# Patient Record
Sex: FEMALE | Race: ASIAN | Employment: FULL TIME | URBAN - METROPOLITAN AREA
[De-identification: names, ages, dates, MRNs, and addresses within clinical notes are randomized per-mention and may not be internally consistent; named-entity substitution may affect disease eponyms.]

---

## 2017-03-03 ENCOUNTER — OFFICE VISIT (OUTPATIENT)
Dept: FAMILY MEDICINE CLINIC | Age: 42
End: 2017-03-03

## 2017-03-03 ENCOUNTER — HOSPITAL ENCOUNTER (OUTPATIENT)
Dept: GENERAL RADIOLOGY | Age: 42
Discharge: HOME | End: 2017-03-03
Payer: COMMERCIAL

## 2017-03-03 VITALS
RESPIRATION RATE: 16 BRPM | SYSTOLIC BLOOD PRESSURE: 121 MMHG | HEART RATE: 80 BPM | OXYGEN SATURATION: 100 % | HEIGHT: 62 IN | TEMPERATURE: 98 F | DIASTOLIC BLOOD PRESSURE: 76 MMHG | BODY MASS INDEX: 31.17 KG/M2 | WEIGHT: 169.4 LBS

## 2017-03-03 DIAGNOSIS — J30.89 ALLERGIC RHINITIS DUE TO OTHER ALLERGEN: Primary | ICD-10-CM

## 2017-03-03 DIAGNOSIS — K21.9 GASTROESOPHAGEAL REFLUX DISEASE WITHOUT ESOPHAGITIS: ICD-10-CM

## 2017-03-03 DIAGNOSIS — M54.50 CHRONIC BILATERAL LOW BACK PAIN WITHOUT SCIATICA: ICD-10-CM

## 2017-03-03 DIAGNOSIS — G89.29 CHRONIC BILATERAL LOW BACK PAIN WITHOUT SCIATICA: ICD-10-CM

## 2017-03-03 LAB
HCG URINE, QL. (POC): NEGATIVE
VALID INTERNAL CONTROL?: YES

## 2017-03-03 PROCEDURE — 72100 X-RAY EXAM L-S SPINE 2/3 VWS: CPT

## 2017-03-03 RX ORDER — OMEPRAZOLE 20 MG/1
20 CAPSULE, DELAYED RELEASE ORAL DAILY
Qty: 30 CAP | Refills: 2 | Status: SHIPPED | OUTPATIENT
Start: 2017-03-03 | End: 2017-05-19 | Stop reason: SDUPTHER

## 2017-03-03 RX ORDER — GLUCOSAMINE SULFATE 1500 MG
POWDER IN PACKET (EA) ORAL DAILY
COMMUNITY
End: 2020-04-18

## 2017-03-03 RX ORDER — FLUTICASONE PROPIONATE 50 MCG
2 SPRAY, SUSPENSION (ML) NASAL DAILY
Qty: 1 BOTTLE | Refills: 1 | Status: SHIPPED | OUTPATIENT
Start: 2017-03-03 | End: 2019-02-27

## 2017-03-03 RX ORDER — CETIRIZINE HCL 10 MG
10 TABLET ORAL DAILY
Qty: 30 TAB | Refills: 1 | Status: SHIPPED | OUTPATIENT
Start: 2017-03-03 | End: 2017-05-19 | Stop reason: SDUPTHER

## 2017-03-03 NOTE — PATIENT INSTRUCTIONS
Back Pain: Care Instructions  Your Care Instructions    Back pain has many possible causes. It is often related to problems with muscles and ligaments of the back. It may also be related to problems with the nerves, discs, or bones of the back. Moving, lifting, standing, sitting, or sleeping in an awkward way can strain the back. Sometimes you don't notice the injury until later. Arthritis is another common cause of back pain. Although it may hurt a lot, back pain usually improves on its own within several weeks. Most people recover in 12 weeks or less. Using good home treatment and being careful not to stress your back can help you feel better sooner. Follow-up care is a key part of your treatment and safety. Be sure to make and go to all appointments, and call your doctor if you are having problems. Its also a good idea to know your test results and keep a list of the medicines you take. How can you care for yourself at home? · Sit or lie in positions that are most comfortable and reduce your pain. Try one of these positions when you lie down:  ¨ Lie on your back with your knees bent and supported by large pillows. ¨ Lie on the floor with your legs on the seat of a sofa or chair. [place] Loach on your side with your knees and hips bent and a pillow between your legs. ¨ Lie on your stomach if it does not make pain worse. · Do not sit up in bed, and avoid soft couches and twisted positions. Bed rest can help relieve pain at first, but it delays healing. Avoid bed rest after the first day of back pain. · Change positions every 30 minutes. If you must sit for long periods of time, take breaks from sitting. Get up and walk around, or lie in a comfortable position. · Try using a heating pad on a low or medium setting for 15 to 20 minutes every 2 or 3 hours. Try a warm shower in place of one session with the heating pad. · You can also try an ice pack for 10 to 15 minutes every 2 to 3 hours.  Put a thin cloth between the ice pack and your skin. · Take pain medicines exactly as directed. ¨ If the doctor gave you a prescription medicine for pain, take it as prescribed. ¨ If you are not taking a prescription pain medicine, ask your doctor if you can take an over-the-counter medicine. · Take short walks several times a day. You can start with 5 to 10 minutes, 3 or 4 times a day, and work up to longer walks. Walk on level surfaces and avoid hills and stairs until your back is better. · Return to work and other activities as soon as you can. Continued rest without activity is usually not good for your back. · To prevent future back pain, do exercises to stretch and strengthen your back and stomach. Learn how to use good posture, safe lifting techniques, and proper body mechanics. When should you call for help? Call your doctor now or seek immediate medical care if:  · You have new or worsening numbness in your legs. · You have new or worsening weakness in your legs. (This could make it hard to stand up.)  · You lose control of your bladder or bowels. Watch closely for changes in your health, and be sure to contact your doctor if:  · Your pain gets worse. · You are not getting better after 2 weeks. Where can you learn more? Go to http://judson-giovanni.info/. Enter G090 in the search box to learn more about \"Back Pain: Care Instructions. \"  Current as of: May 23, 2016  Content Version: 11.1  © 7337-9479 [employer]. Care instructions adapted under license by [employer] (which disclaims liability or warranty for this information). If you have questions about a medical condition or this instruction, always ask your healthcare professional. [address] any warranty or liability for your use of this information. Learning About Relief for Back Pain  What is back tension and strain?     Back strain happens when you overstretch, or pull, a muscle in your back. You may hurt your back in an accident or when you exercise or lift something. Most back pain will get better with rest and time. You can take care of yourself at home to help your back heal.  What can you do first to relieve back pain? When you first feel back pain, try these steps:  · Walk. Take a short walk (10 to 20 minutes) on a level surface (no slopes, hills, or stairs) every 2 to 3 hours. Walk only distances you can manage without pain, especially leg pain. · Relax. Find a comfortable position for rest. Some people are comfortable on the floor or a medium-firm bed with a small pillow under their head and another under their knees. Some people prefer to lie on their side with a pillow between their knees. Don't stay in one position for too long. · Try heat or ice. Try using a heating pad on a low or medium setting, or take a warm shower, for 15 to 20 minutes every 2 to 3 hours. Or you can buy single-use heat wraps that last up to 8 hours. You can also try an ice pack for 10 to 15 minutes every 2 to 3 hours. You can use an ice pack or a bag of frozen vegetables wrapped in a thin towel. There is not strong evidence that either heat or ice will help, but you can try them to see if they help. You may also want to try switching between heat and cold. · Take pain medicine exactly as directed. ¨ If the doctor gave you a prescription medicine for pain, take it as prescribed. ¨ If you are not taking a prescription pain medicine, ask your doctor if you can take an over-the-counter medicine. What else can you do? · Stretch and exercise. Exercises that increase flexibility may relieve your pain and make it easier for your muscles to keep your spine in a good, neutral position. And don't forget to keep walking. · Do self-massage. You can use self-massage to unwind after work or school or to energize yourself in the morning. You can easily massage your feet, hands, or neck.  Self-massage works best if you are in comfortable clothes and are sitting or lying in a comfortable position. Use oil or lotion to massage bare skin. · Reduce stress. Back pain can lead to a vicious [place]: Distress about the pain tenses the muscles in your back, which in turn causes more pain. Learn how to relax your mind and your muscles to lower your stress. Where can you learn more? Go to http://judson-giovanni.info/. Enter H009 in the search box to learn more about \"Learning About Relief for Back Pain. \"  Current as of: May 23, 2016  Content Version: 11.1  © 8913-7282 [employer]. Care instructions adapted under license by [employer] (which disclaims liability or warranty for this information). If you have questions about a medical condition or this instruction, always ask your healthcare professional. [address] any warranty or liability for your use of this information. Back Stretches: Exercises  Your Care Instructions  Here are some examples of exercises for stretching your back. Start each exercise slowly. Ease off the exercise if you start to have pain. Your doctor or physical therapist will tell you when you can start these exercises and which ones will work best for you. How to do the exercises  Overhead stretch    1. Stand comfortably with your feet shoulder-width apart. 2. Looking straight ahead, raise both arms over your head and reach toward the ceiling. Do not allow your head to tilt back. 3. Hold for 15 to 30 seconds, then lower your arms to your sides. 4. Repeat 2 to 4 times. Side stretch    1. Stand comfortably with your feet shoulder-width apart. 2. Raise one arm over your head, and then lean to the other side. 3. Slide your hand down your leg as you let the weight of your arm gently stretch your side muscles. Hold for 15 to 30 seconds. 4. Repeat 2 to 4 times on each side. Press-up    1.  Lie on your stomach, supporting your body with your forearms. 2. Press your elbows down into the floor to raise your upper back. As you do this, relax your stomach muscles and allow your back to arch without using your back muscles. As your press up, do not let your hips or pelvis come off the floor. 3. Hold for 15 to 30 seconds, then relax. 4. Repeat 2 to 4 times. Relax and rest    1. Lie on your back with a rolled towel under your neck and a pillow under your knees. Extend your arms comfortably to your sides. 2. Relax and breathe normally. 3. Remain in this position for about 10 minutes. 4. If you can, do this 2 or 3 times each day. Follow-up care is a key part of your treatment and safety. Be sure to make and go to all appointments, and call your doctor if you are having problems. It's also a good idea to know your test results and keep a list of the medicines you take. Where can you learn more? Go to http://judson-giovanni.info/. Enter K547 in the search box to learn more about \"Back Stretches: Exercises. \"  Current as of: May 23, 2016  Content Version: 11.1  © 9143-9104 [employer]. Care instructions adapted under license by [employer] (which disclaims liability or warranty for this information). If you have questions about a medical condition or this instruction, always ask your healthcare professional. [address] any warranty or liability for your use of this information.

## 2017-03-03 NOTE — PROGRESS NOTES
Chief Complaint   Patient presents with   [facility] Establish Care     cough, drainage down back of throat, no color to drainage    Mass     bump noted  on left side of rib cage

## 2017-03-03 NOTE — PROGRESS NOTES
Patient Identification  Lidia Villasenor is a 46 y.o. female. Who's new to our practice. Previous PCP Mary Thrasher, last there 10/2016. Chief Complaint   Establish Care (cough, drainage down back of throat, no color to drainage) and Mass (bump noted  on left side of rib cage)    Allergies: was previously tried on zyrtec and flonase by another doctor but admit to not being compliance. Discussed needs for an honest try. Denies fever, chills, illness, myalgia. Pt also have a history of eczema. Patient presents for evaluation of dry cough. Onset of symptoms was several years ago, with unchanged since that time. Respiratory symptoms include runny nose and congestion. Patient denies chest pain and shortness of breath. Patient has similar previous allergic reactions. Patient denies exposure to new medications or allergens. GERD: no vomiting, melena. Haven't tried anything. Low back pain chronic for years, worsen the last month. 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 No new symptoms. No radiation, currently pain level mild. Denies saddle anesthesia, loss of bowel or bladder, LE weakness, fever, chills. Past Medical History:   Diagnosis Date    IBS (irritable bowel syndrome)      Family History   Problem Relation Age of Onset    Asthma Mother     Hypertension Father     Diabetes Father     Elevated Lipids Father     Cancer Sister      Current Outpatient Prescriptions   Medication Sig Dispense Refill    cholecalciferol (VITAMIN D3) 1,000 unit cap Take  by mouth daily.  fluticasone (FLONASE) 50 mcg/actuation nasal spray 2 Sprays by Both Nostrils route daily. 1 Bottle 1    cetirizine (ZYRTEC) 10 mg tablet Take 1 Tab by mouth daily. 30 Tab 1    omeprazole (PRILOSEC) 20 mg capsule Take 1 Cap by mouth daily.  30 Cap 2     Allergies   Allergen Reactions    Banana Other (comments)     GERD    Macrobid [Nitrofurantoin Monohyd/M-Cryst] Hives Social History     Social History    Marital status: SINGLE     Spouse name: N/A    Number of children: N/A    Years of education: N/A     Occupational History    Not on file. Social History Main Topics    Smoking status: Never Smoker    Smokeless tobacco: Never Used    Alcohol use No    Drug use: No    Sexual activity: Yes     Partners: Male     Birth control/ protection: IUD     Other Topics Concern    Not on file     Social History Narrative     Review of Systems  Pertinent items are noted in HPI. Physical Exam     Visit Vitals    /76 (BP 1 Location: Right arm, BP Patient Position: Sitting)    Pulse 80    Temp 98 °F (36.7 °C) (Oral)    Resp 16    Ht 5' 2\" (1.575 m)    Wt 169 lb 6.4 oz (76.8 kg)    LMP 03/03/2017    SpO2 100%    BMI 30.98 kg/m2     General appearance: alert, cooperative, no distress, appears stated age  Head: Normocephalic, without obvious abnormality, atraumatic  Eyes: conjunctivae/corneas clear. PERRL, EOM's intact  Ears: normal TM's and external ear canals AU  Nose: clear discharge, mild congestion, turbinates pale  Throat: Lips, mucosa, and tongue normal. Teeth and gums normal  Neck: supple, symmetrical, trachea midline, no adenopathy, no carotid bruit and no JVD  Lungs: clear to auscultation bilaterally  Heart: regular rate and rhythm, S1, S2 normal, no murmur, click, rub or gallop  Abdomen: soft, non-tender. Bowel sounds normal. No masses,  no organomegaly  Extremities: extremities normal, atraumatic, no cyanosis or edema  Pulses: 2+ and symmetric  Neurologic: Alert and oriented X 3, normal strength and tone. Normal symmetric reflexes. Normal coordination and gait  Back: knees reflex +2 bilat. Negative straight leg raise. Normal sensate, strength.        Results for orders placed or performed in visit on 03/03/17   AMB POC URINE PREGNANCY TEST, VISUAL COLOR COMPARISON   Result Value Ref Range    VALID INTERNAL CONTROL POC Yes     HCG urine, Ql. (POC) Negative Negative       ASSESSMENT/PLANS    [facility]. Diagnoses and all orders for this visit:    Allergic rhinitis due to other allergen  -     AMB POC URINE PREGNANCY TEST, VISUAL COLOR COMPARISON  -     fluticasone (FLONASE) 50 mcg/actuation nasal spray; 2 Sprays by Both Nostrils route daily. -     cetirizine (ZYRTEC) 10 mg tablet; Take 1 Tab by mouth daily. Gastroesophageal reflux disease without esophagitis  -     AMB POC URINE PREGNANCY TEST, VISUAL COLOR COMPARISON  -     omeprazole (PRILOSEC) 20 mg capsule; Take 1 Cap by mouth daily.     Chronic bilateral low back pain without sciatica  -     AMB POC URINE PREGNANCY TEST, VISUAL COLOR COMPARISON  -     XR SPINE LUMB 2 OR 3 V; Future        Follow-up Disposition:  Return in about 2 weeks (around 3/17/2017) for annual exam.      Marce Raman MD  3/3/2017

## 2017-03-03 NOTE — MR AVS SNAPSHOT
Visit Information Date & Time Provider Department Dept. Phone Encounter #  
 3/3/2017  8:45 AM Bernardo Higginbotham MD [facility] at [address] [id number] Follow-up Instructions Return in about 2 weeks (around 3/17/2017) for annual exam. Upcoming Health Maintenance Date Due  
 PAP AKA CERVICAL CYTOLOGY 3/15/1998 DTaP/Tdap/Td series (1 - Tdap) 10/26/2011 INFLUENZA AGE 9 TO ADULT 8/1/2016 COLONOSCOPY 5/16/2021 Allergies as of 3/3/2017  Review Complete On: 3/3/2017 By: Bernardo Higginbotham MD  
  
 Severity Noted Reaction Type Reactions Banana  03/31/2011    Other (comments) GERD Macrobid [Nitrofurantoin Monohyd/m-cryst]  03/31/2011    Hives Current Immunizations  Reviewed on 10/25/2011 Name Date Hepatitis B Vaccine 10/25/2011 10:40 AM  
 Influenza Vaccine Split 10/25/2011 10:42 AM  
 PPD 8/8/2011 TD Vaccine 10/25/2011 10:39 AM  
  
 Not reviewed this visit You Were Diagnosed With   
  
 Codes Comments Allergic rhinitis due to other allergen    -  Primary ICD-10-CM: J30.89 ICD-9-CM: 477.8 Gastroesophageal reflux disease without esophagitis     ICD-10-CM: K21.9 ICD-9-CM: 530.81 Chronic bilateral low back pain without sciatica     ICD-10-CM: M54.5, G89.29 ICD-9-CM: 724.2, 338.29 Vitals BP  
  
  
  
  
  
 121/76 (BP 1 Location: Right arm, BP Patient Position: Sitting) BMI and BSA Data Body Mass Index Body Surface Area 30.98 kg/m 2 1.83 m 2 Preferred Pharmacy Pharmacy Name Phone [address] [facility] [address], [address] [facility] [phone] Your Updated Medication List  
  
   
This list is accurate as of: 3/3/17  9:29 AM.  Always use your most recent med list.  
  
  
  
  
 cetirizine 10 mg tablet Commonly known as:  ZYRTEC Take 1 Tab by mouth daily. fluticasone 50 mcg/actuation nasal spray Commonly known as:  Jai Rogers 2 Sprays by Both Nostrils route daily. omeprazole 20 mg capsule Commonly known as:  PRILOSEC Take 1 Cap by mouth daily. VITAMIN D3 1,000 unit Cap Generic drug:  cholecalciferol Take  by mouth daily. Prescriptions Sent to Pharmacy Refills  
 fluticasone (FLONASE) 50 mcg/actuation nasal spray 1 Si Sprays by Both Nostrils route daily. Class: Normal  
 Pharmacy: [facility], [address] [place] [address] RD AT [address] [place] [address] Ph #: [phone] Route: Both Nostrils  
 cetirizine (ZYRTEC) 10 mg tablet 1 Sig: Take 1 Tab by mouth daily. Class: Normal  
 Pharmacy: [facility] [address] [facility] [address], [address] [place] [address] RD AT [address] [place] [address] Ph #: [phone] Route: Oral  
 omeprazole (PRILOSEC) 20 mg capsule 2 Sig: Take 1 Cap by mouth daily. Class: Normal  
 Pharmacy: [facility], [address] [place] [address] RD AT [address] [place] [address] Ph #: [phone] Route: Oral  
  
We Performed the Following AMB POC URINE PREGNANCY TEST, VISUAL COLOR COMPARISON [33433 CPT(R)] Follow-up Instructions Return in about 2 weeks (around 3/17/2017) for annual exam. To-Do List   
 2017 Imaging:  XR SPINE LUMB 2 OR 3 V Patient Instructions Back Pain: Care Instructions Your Care Instructions Back pain has many possible causes. It is often related to problems with muscles and ligaments of the back. It may also be related to problems with the nerves, discs, or bones of the back. Moving, lifting, standing, sitting, or sleeping in an awkward way can strain the back. Sometimes you don't notice the injury until later. Arthritis is another common cause of back pain. Although it may hurt a lot, back pain usually improves on its own within several weeks. Most people recover in 12 weeks or less.  Using good home treatment and being careful not to stress your back can help you feel better sooner. Follow-up care is a key part of your treatment and safety. Be sure to make and go to all appointments, and call your doctor if you are having problems. Its also a good idea to know your test results and keep a list of the medicines you take. How can you care for yourself at home? · Sit or lie in positions that are most comfortable and reduce your pain. Try one of these positions when you lie down: ¨ Lie on your back with your knees bent and supported by large pillows. ¨ Lie on the floor with your legs on the seat of a sofa or chair. Lucy Perez on your side with your knees and hips bent and a pillow between your legs. ¨ Lie on your stomach if it does not make pain worse. · Do not sit up in bed, and avoid soft couches and twisted positions. Bed rest can help relieve pain at first, but it delays healing. Avoid bed rest after the first day of back pain. · Change positions every 30 minutes. If you must sit for long periods of time, take breaks from sitting. Get up and walk around, or lie in a comfortable position. · Try using a heating pad on a low or medium setting for 15 to 20 minutes every 2 or 3 hours. Try a warm shower in place of one session with the heating pad. · You can also try an ice pack for 10 to 15 minutes every 2 to 3 hours. Put a thin cloth between the ice pack and your skin. · Take pain medicines exactly as directed. ¨ If the doctor gave you a prescription medicine for pain, take it as prescribed. ¨ If you are not taking a prescription pain medicine, ask your doctor if you can take an over-the-counter medicine. · Take short walks several times a day. You can start with 5 to 10 minutes, 3 or 4 times a day, and work up to longer walks. Walk on level surfaces and avoid hills and stairs until your back is better. · Return to work and other activities as soon as you can.  Continued rest without activity is usually not good for your back. · To prevent future back pain, do exercises to stretch and strengthen your back and stomach. Learn how to use good posture, safe lifting techniques, and proper body mechanics. When should you call for help? Call your doctor now or seek immediate medical care if: 
· You have new or worsening numbness in your legs. · You have new or worsening weakness in your legs. (This could make it hard to stand up.) · You lose control of your bladder or bowels. Watch closely for changes in your health, and be sure to contact your doctor if: 
· Your pain gets worse. · You are not getting better after 2 weeks. Where can you learn more? Go to http://judson-giovanni.info/. Enter S560 in the search box to learn more about \"Back Pain: Care Instructions. \" Current as of: May 23, 2016 Content Version: 11.1 © 2465-8765 [employer]. Care instructions adapted under license by [employer] (which disclaims liability or warranty for this information). If you have questions about a medical condition or this instruction, always ask your healthcare professional. [place] [address] any warranty or liability for your use of this information. Learning About Relief for Back Pain What is back tension and strain? Back strain happens when you overstretch, or pull, a muscle in your back. You may hurt your back in an accident or when you exercise or lift something. Most back pain will get better with rest and time. You can take care of yourself at home to help your back heal. 
What can you do first to relieve back pain? When you first feel back pain, try these steps: 
· Walk. Take a short walk (10 to 20 minutes) on a level surface (no slopes, hills, or stairs) every 2 to 3 hours. Walk only distances you can manage without pain, especially leg pain. · Relax.  Find a comfortable position for rest. Some people are comfortable on the floor or a medium-firm bed with a small pillow under their head and another under their knees. Some people prefer to lie on their side with a pillow between their knees. Don't stay in one position for too long. · Try heat or ice. Try using a heating pad on a low or medium setting, or take a warm shower, for 15 to 20 minutes every 2 to 3 hours. Or you can buy single-use heat wraps that last up to 8 hours. You can also try an ice pack for 10 to 15 minutes every 2 to 3 hours. You can use an ice pack or a bag of frozen vegetables wrapped in a thin towel. There is not strong evidence that either heat or ice will help, but you can try them to see if they help. You may also want to try switching between heat and cold. · Take pain medicine exactly as directed. ¨ If the doctor gave you a prescription medicine for pain, take it as prescribed. ¨ If you are not taking a prescription pain medicine, ask your doctor if you can take an over-the-counter medicine. What else can you do? · Stretch and exercise. Exercises that increase flexibility may relieve your pain and make it easier for your muscles to keep your spine in a good, neutral position. And don't forget to keep walking. · Do self-massage. You can use self-massage to unwind after work or school or to energize yourself in the morning. You can easily massage your feet, hands, or neck. Self-massage works best if you are in comfortable clothes and are sitting or lying in a comfortable position. Use oil or lotion to massage bare skin. · Reduce stress. Back pain can lead to a vicious Bill [place]: Distress about the pain tenses the muscles in your back, which in turn causes more pain. Learn how to relax your mind and your muscles to lower your stress. Where can you learn more? Go to http://judson-giovanni.info/. Enter F440 in the search box to learn more about \"Learning About Relief for Back Pain. \" Current as of: May 23, 2016 Content Version: 11.1 © 6672-3583 Healthwise, Incorporated. Care instructions adapted under license by [employer] (which disclaims liability or warranty for this information). If you have questions about a medical condition or this instruction, always ask your healthcare professional. [address] any warranty or liability for your use of this information. Back Stretches: Exercises Your Care Instructions Here are some examples of exercises for stretching your back. Start each exercise slowly. Ease off the exercise if you start to have pain. Your doctor or physical therapist will tell you when you can start these exercises and which ones will work best for you. How to do the exercises Overhead stretch 1. Stand comfortably with your feet shoulder-width apart. 2. Looking straight ahead, raise both arms over your head and reach toward the ceiling. Do not allow your head to tilt back. 3. Hold for 15 to 30 seconds, then lower your arms to your sides. 4. Repeat 2 to 4 times. Side stretch 1. Stand comfortably with your feet shoulder-width apart. 2. Raise one arm over your head, and then lean to the other side. 3. Slide your hand down your leg as you let the weight of your arm gently stretch your side muscles. Hold for 15 to 30 seconds. 4. Repeat 2 to 4 times on each side. Press-up 1. Lie on your stomach, supporting your body with your forearms. 2. Press your elbows down into the floor to raise your upper back. As you do this, relax your stomach muscles and allow your back to arch without using your back muscles. As your press up, do not let your hips or pelvis come off the floor. 3. Hold for 15 to 30 seconds, then relax. 4. Repeat 2 to 4 times. Relax and rest 
 
1. Lie on your back with a rolled towel under your neck and a pillow under your knees. Extend your arms comfortably to your sides. 2. Relax and breathe normally. 3. Remain in this position for about 10 minutes. 4. If you can, do this 2 or 3 times each day. Follow-up care is a key part of your treatment and safety. Be sure to make and go to all appointments, and call your doctor if you are having problems. It's also a good idea to know your test results and keep a list of the medicines you take. Where can you learn more? Go to http://judson-giovanni.info/. Enter B833 in the search box to learn more about \"Back Stretches: Exercises. \" Current as of: May 23, 2016 Content Version: 11.1 © 3465-3396 SwipeStation. Care instructions adapted under license by [employer] (which disclaims liability or warranty for this information). If you have questions about a medical condition or this instruction, always ask your healthcare professional. [address] any warranty or liability for your use of this information. Introducing [facility] & HEALTH SERVICES! Addis Suarez introduces [employer] patient portal. Now you can access parts of your medical record, email your doctor's office, and request medication refills online. 1. In your internet browser, go to [email or web address] 2. Click on the First Time User? Click Here link in the Sign In box. You will see the New Member Sign Up page. 3. Enter your [employer] Access Code exactly as it appears below. You will not need to use this code after youve completed the sign-up process. If you do not sign up before the expiration date, you must request a new code. · [employer] Access Code: [email or web address] Expires: 6/1/2017  9:29 AM 
 
4. Enter the last four digits of your Social Security Number (xxxx) and Date of Birth (mm/dd/yyyy) as indicated and click Submit. You will be taken to the next sign-up page. 5. Create a [email or web address] ID. This will be your [employer] login ID and cannot be changed, so think of one that is secure and easy to remember. 6. Create a [email or web address] password. You can change your password at any time. 7. Enter your Password Reset Question and Answer. This can be used at a later time if you forget your password. 8. Enter your e-mail address. You will receive e-mail notification when new information is available in [address] 9. Click Sign Up. You can now view and download portions of your medical record. 10. Click the Download Summary menu link to download a portable copy of your medical information. If you have questions, please visit the Frequently Asked Questions section of the [email or web address] website. Remember, [email or web address] is NOT to be used for urgent needs. For medical emergencies, dial 911. Now available from your iPhone and Android! Please provide this summary of care documentation to your next provider. Your primary care clinician is listed as NONE. If you have any questions after today's visit, please call [phone].

## 2017-03-17 ENCOUNTER — OFFICE VISIT (OUTPATIENT)
Dept: FAMILY MEDICINE CLINIC | Age: 42
End: 2017-03-17

## 2017-03-17 VITALS
HEIGHT: 62 IN | SYSTOLIC BLOOD PRESSURE: 119 MMHG | TEMPERATURE: 98.2 F | BODY MASS INDEX: 31.1 KG/M2 | HEART RATE: 90 BPM | RESPIRATION RATE: 16 BRPM | OXYGEN SATURATION: 100 % | DIASTOLIC BLOOD PRESSURE: 73 MMHG | WEIGHT: 169 LBS

## 2017-03-17 DIAGNOSIS — R73.03 PREDIABETES: ICD-10-CM

## 2017-03-17 DIAGNOSIS — Z97.5 IUD (INTRAUTERINE DEVICE) IN PLACE: ICD-10-CM

## 2017-03-17 DIAGNOSIS — Z00.00 ANNUAL PHYSICAL EXAM: Primary | ICD-10-CM

## 2017-03-17 DIAGNOSIS — J30.9 ALLERGIC RHINOCONJUNCTIVITIS: ICD-10-CM

## 2017-03-17 DIAGNOSIS — H10.10 ALLERGIC RHINOCONJUNCTIVITIS: ICD-10-CM

## 2017-03-17 DIAGNOSIS — Z23 ENCOUNTER FOR IMMUNIZATION: ICD-10-CM

## 2017-03-17 DIAGNOSIS — E61.1 IRON DEFICIENCY: ICD-10-CM

## 2017-03-17 PROBLEM — Z79.899 ENCOUNTER FOR LONG-TERM (CURRENT) USE OF MEDICATIONS: Status: ACTIVE | Noted: 2017-03-17

## 2017-03-17 PROBLEM — Z83.3 FAMILY HISTORY OF DIABETES MELLITUS: Status: ACTIVE | Noted: 2017-03-17

## 2017-03-17 RX ORDER — MONTELUKAST SODIUM 10 MG/1
10 TABLET ORAL DAILY
Qty: 30 TAB | Refills: 1 | Status: SHIPPED | OUTPATIENT
Start: 2017-03-17 | End: 2019-02-06

## 2017-03-17 NOTE — PROGRESS NOTES
Chief Complaint   Patient presents with    Annual Exam   Papsmear done last month at [facility], results enlarged uterus.

## 2017-03-17 NOTE — MR AVS SNAPSHOT
Visit Information Date & Time Provider Department Dept. Phone Encounter #  
 3/17/2017  8:45 AM Iron Valdez MD [facility] at [address] [id number] Follow-up Instructions Return in about 2 months (around 5/17/2017) for 3rd hep B shot, allergies. Upcoming Health Maintenance Date Due  
 PAP AKA CERVICAL CYTOLOGY 1/26/2020 COLONOSCOPY 5/16/2021 DTaP/Tdap/Td series (2 - Td) 3/17/2027 Allergies as of 3/17/2017  Review Complete On: 3/3/2017 By: Iron Valdez MD  
  
 Severity Noted Reaction Type Reactions Banana  03/31/2011    Other (comments) GERD Macrobid [Nitrofurantoin Monohyd/m-cryst]  03/31/2011    Hives Current Immunizations  Reviewed on 10/25/2011 Name Date Hep A and Hep B Vaccine  Incomplete Hepatitis B Vaccine 10/25/2011 10:40 AM  
 Influenza Vaccine Split 10/25/2011 10:42 AM  
 PPD 8/8/2011 TD Vaccine 10/25/2011 10:39 AM  
  
 Not reviewed this visit You Were Diagnosed With   
  
 Codes Comments Annual physical exam    -  Primary ICD-10-CM: Z00.00 ICD-9-CM: V70.0 IUD (intrauterine device) in place     ICD-10-CM: Z97.5 ICD-9-CM: V45.51 Prediabetes     ICD-10-CM: R73.03 
ICD-9-CM: 790.29 Encounter for immunization     ICD-10-CM: J06 ICD-9-CM: V03.89 BMI 30.0-30.9,adult     ICD-10-CM: Z68.30 ICD-9-CM: V85.30 Allergic rhinoconjunctivitis     ICD-10-CM: J30.9, H10.10 ICD-9-CM: 477.9, 372.05 Vitals BP Pulse Temp Resp Height(growth percentile) Weight(growth percentile) 119/73 (BP 1 Location: Right arm, BP Patient Position: Sitting) 90 98.2 °F (36.8 °C) (Oral) 16 5' 2\" (1.575 m) 169 lb (76.7 kg) LMP SpO2 BMI OB Status Smoking Status 03/03/2017 100% 30.91 kg/m2 Having regular periods Never Smoker BMI and BSA Data Body Mass Index Body Surface Area 30.91 kg/m 2 1.83 m 2 Preferred Pharmacy Pharmacy Name Phone [address] [facility] [phone] Your Updated Medication List  
  
   
This list is accurate as of: 3/17/17  9:31 AM.  Always use your most recent med list.  
  
  
  
  
 cetirizine 10 mg tablet Commonly known as:  ZYRTEC Take 1 Tab by mouth daily. fluticasone 50 mcg/actuation nasal spray Commonly known as:  Sarika Earnest 2 Sprays by Both Nostrils route daily. montelukast 10 mg tablet Commonly known as:  SINGULAIR Take 1 Tab by mouth daily. omeprazole 20 mg capsule Commonly known as:  PRILOSEC Take 1 Cap by mouth daily. VITAMIN D3 1,000 unit Cap Generic drug:  cholecalciferol Take  by mouth daily. Prescriptions Sent to Pharmacy Refills  
 montelukast (SINGULAIR) 10 mg tablet 1 Sig: Take 1 Tab by mouth daily. Class: Normal  
 Pharmacy: [facility], [address] [place] [address] RD AT [address] [facility] Ph #: [phone] Route: Oral  
  
We Performed the Following CBC W/O DIFF [82636 CPT(R)] HEMOGLOBIN A1C W/O EAG [95904 CPT(R)] HEPATITIS A AND HEPATITIS B (HEPA-HEPB), ADULT DOSAGE, IM [76200 CPT(R)] LIPID PANEL [49280 CPT(R)] METABOLIC PANEL, COMPREHENSIVE [80612 CPT(R)] TSH RFX ON ABNORMAL TO FREE T4 [[id number] Custom] URINALYSIS W/ RFLX MICROSCOPIC [89759 CPT(R)] Follow-up Instructions Return in about 2 months (around 5/17/2017) for 3rd hep B shot, allergies. Introducing hospitals & HEALTH SERVICES! Erica Obrien introduces [employer] patient portal. Now you can access parts of your medical record, email your doctor's office, and request medication refills online. 1. In your internet browser, go to [email or web address] 2. Click on the First Time User? Click Here link in the Sign In box. You will see the New Member Sign Up page. 3. Enter your [employer] Access Code exactly as it appears below. You will not need to use this code after youve completed the sign-up process. If you do not sign up before the expiration date, you must request a new code. · [employer] Access Code: [email or web address] Expires: 6/1/2017 10:29 AM 
 
4. Enter the last four digits of your Social Security Number (xxxx) and Date of Birth (mm/dd/yyyy) as indicated and click Submit. You will be taken to the next sign-up page. 5. Create a [employer] ID. This will be your [employer] login ID and cannot be changed, so think of one that is secure and easy to remember. 6. Create a [employer] password. You can change your password at any time. 7. Enter your Password Reset Question and Answer. This can be used at a later time if you forget your password. 8. Enter your e-mail address. You will receive e-mail notification when new information is available in [address] 9. Click Sign Up. You can now view and download portions of your medical record. 10. Click the Download Summary menu link to download a portable copy of your medical information. If you have questions, please visit the Frequently Asked Questions section of the [employer] website. Remember, [employer] is NOT to be used for urgent needs. For medical emergencies, dial 911. Now available from your iPhone and Android! Please provide this summary of care documentation to your next provider. Your primary care clinician is listed as [facility]. If you have any questions after today's visit, please call [phone].

## 2017-03-18 LAB
ALBUMIN SERPL-MCNC: 4.5 G/DL (ref 3.5–5.5)
ALBUMIN/GLOB SERPL: 1.7 {RATIO} (ref 1.2–2.2)
ALP SERPL-CCNC: 46 IU/L (ref 39–117)
ALT SERPL-CCNC: 20 IU/L (ref 0–32)
APPEARANCE UR: CLEAR
AST SERPL-CCNC: 17 IU/L (ref 0–40)
BILIRUB SERPL-MCNC: 0.3 MG/DL (ref 0–1.2)
BILIRUB UR QL STRIP: NEGATIVE
BUN SERPL-MCNC: 9 MG/DL (ref 6–24)
BUN/CREAT SERPL: 13 (ref 9–23)
CALCIUM SERPL-MCNC: 9.4 MG/DL (ref 8.7–10.2)
CHLORIDE SERPL-SCNC: 100 MMOL/L (ref 96–106)
CHOLEST SERPL-MCNC: 199 MG/DL (ref 100–199)
CO2 SERPL-SCNC: 23 MMOL/L (ref 18–29)
COLOR UR: YELLOW
CREAT SERPL-MCNC: 0.69 MG/DL (ref 0.57–1)
ERYTHROCYTE [DISTWIDTH] IN BLOOD BY AUTOMATED COUNT: 15 % (ref 12.3–15.4)
GLOBULIN SER CALC-MCNC: 2.7 G/DL (ref 1.5–4.5)
GLUCOSE SERPL-MCNC: 89 MG/DL (ref 65–99)
GLUCOSE UR QL: NEGATIVE
HBA1C MFR BLD: 6.1 % (ref 4.8–5.6)
HCT VFR BLD AUTO: 37.7 % (ref 34–46.6)
HDLC SERPL-MCNC: 65 MG/DL
HGB BLD-MCNC: 12.5 G/DL (ref 11.1–15.9)
HGB UR QL STRIP: NEGATIVE
KETONES UR QL STRIP: NEGATIVE
LDLC SERPL CALC-MCNC: 103 MG/DL (ref 0–99)
LEUKOCYTE ESTERASE UR QL STRIP: NEGATIVE
MCH RBC QN AUTO: 27.2 PG (ref 26.6–33)
MCHC RBC AUTO-ENTMCNC: 33.2 G/DL (ref 31.5–35.7)
MCV RBC AUTO: 82 FL (ref 79–97)
MICRO URNS: NORMAL
NITRITE UR QL STRIP: NEGATIVE
PH UR STRIP: 7 [PH] (ref 5–7.5)
PLATELET # BLD AUTO: 313 X10E3/UL (ref 150–379)
POTASSIUM SERPL-SCNC: 4 MMOL/L (ref 3.5–5.2)
PROT SERPL-MCNC: 7.2 G/DL (ref 6–8.5)
PROT UR QL STRIP: NEGATIVE
RBC # BLD AUTO: 4.59 X10E6/UL (ref 3.77–5.28)
SODIUM SERPL-SCNC: 138 MMOL/L (ref 134–144)
SP GR UR: 1.01 (ref 1–1.03)
TRIGL SERPL-MCNC: 157 MG/DL (ref 0–149)
TSH SERPL DL<=0.005 MIU/L-ACNC: 1.4 UIU/ML (ref 0.45–4.5)
UROBILINOGEN UR STRIP-MCNC: 0.2 MG/DL (ref 0.2–1)
VLDLC SERPL CALC-MCNC: 31 MG/DL (ref 5–40)
WBC # BLD AUTO: 6.4 X10E3/UL (ref 3.4–10.8)

## 2017-04-19 ENCOUNTER — OFFICE VISIT (OUTPATIENT)
Dept: FAMILY MEDICINE CLINIC | Age: 42
End: 2017-04-19

## 2017-04-19 VITALS
OXYGEN SATURATION: 99 % | RESPIRATION RATE: 14 BRPM | WEIGHT: 169.8 LBS | SYSTOLIC BLOOD PRESSURE: 114 MMHG | DIASTOLIC BLOOD PRESSURE: 64 MMHG | BODY MASS INDEX: 31.25 KG/M2 | HEART RATE: 76 BPM | HEIGHT: 62 IN | TEMPERATURE: 97.8 F

## 2017-04-19 DIAGNOSIS — E55.9 VITAMIN D DEFICIENCY: ICD-10-CM

## 2017-04-19 DIAGNOSIS — R73.03 PRE-DIABETES: Primary | ICD-10-CM

## 2017-04-19 RX ORDER — ERGOCALCIFEROL 1.25 MG/1
50000 CAPSULE ORAL
Qty: 12 CAP | Refills: 0 | Status: SHIPPED | OUTPATIENT
Start: 2017-04-19 | End: 2019-02-27

## 2017-04-19 NOTE — MR AVS SNAPSHOT
Visit Information Date & Time Provider Department Dept. Phone Encounter #  
 4/19/2017  9:15 AM Christian Martin MD [facility] at [address] [id number] Follow-up Instructions Return in about 6 months (around 10/19/2017) for chronic care. Your Appointments 5/19/2017  8:45 AM  
ROUTINE CARE with Christian Martin MD  
[facility] at [facility]) Appt Note: fu on allergies   also 3rd hep b vaccine [facility] [address] [id number]  
[facility] Upcoming Health Maintenance Date Due  
 PAP AKA CERVICAL CYTOLOGY 1/26/2020 COLONOSCOPY 5/16/2021 DTaP/Tdap/Td series (2 - Td) 3/17/2027 Allergies as of 4/19/2017  Review Complete On: 4/19/2017 By: Christian Martin MD  
  
 Severity Noted Reaction Type Reactions Banana  03/31/2011    Other (comments) GERD Macrobid [Nitrofurantoin Monohyd/m-cryst]  03/31/2011    Hives Current Immunizations  Reviewed on 10/25/2011 Name Date Hep A and Hep B Vaccine 3/17/2017 Hepatitis B Vaccine 10/25/2011 10:40 AM  
 Influenza Vaccine Split 10/25/2011 10:42 AM  
 PPD 8/8/2011 TD Vaccine 10/25/2011 10:39 AM  
  
 Not reviewed this visit You Were Diagnosed With   
  
 Codes Comments Pre-diabetes    -  Primary ICD-10-CM: R73.03 
ICD-9-CM: 790.29 Vitamin D deficiency     ICD-10-CM: E55.9 ICD-9-CM: 268.9 Vitals BP Pulse Temp Resp Height(growth percentile) Weight(growth percentile) 114/64 (BP 1 Location: Left arm, BP Patient Position: Sitting) 76 97.8 °F (36.6 °C) (Oral) 14 5' 2\" (1.575 m) 169 lb 12.8 oz (77 kg) LMP SpO2 BMI OB Status Smoking Status 03/29/2017 99% 31.06 kg/m2 Having regular periods Never Smoker BMI and BSA Data Body Mass Index Body Surface Area 31.06 kg/m 2 1.84 m 2 Preferred Pharmacy Pharmacy Name Phone [facility], [address] [facility] [phone] Your Updated Medication List  
  
   
This list is accurate as of: 4/19/17  9:31 AM.  Always use your most recent med list.  
  
  
  
  
 cetirizine 10 mg tablet Commonly known as:  ZYRTEC Take 1 Tab by mouth daily. ergocalciferol 50,000 unit capsule Commonly known as:  ERGOCALCIFEROL Take 1 Cap by mouth every seven (7) days. fluticasone 50 mcg/actuation nasal spray Commonly known as:  [place] Petite 2 Sprays by Both Nostrils route daily. montelukast 10 mg tablet Commonly known as:  SINGULAIR Take 1 Tab by mouth daily. omeprazole 20 mg capsule Commonly known as:  PRILOSEC Take 1 Cap by mouth daily. VITAMIN D3 1,000 unit Cap Generic drug:  cholecalciferol Take  by mouth daily. Prescriptions Sent to Pharmacy Refills  
 ergocalciferol (ERGOCALCIFEROL) 50,000 unit capsule 0 Sig: Take 1 Cap by mouth every seven (7) days. Class: Normal  
 Pharmacy: [address] [facility] [address], [address] AT [address] [facility] #: [phone] Route: Oral  
  
Follow-up Instructions Return in about 6 months (around 10/19/2017) for chronic care. Introducing [facility] & HEALTH SERVICES! Meron Beltran introduces [employer] patient portal. Now you can access parts of your medical record, email your doctor's office, and request medication refills online. 1. In your internet browser, go to [email or web address] 2. Click on the First Time User? Click Here link in the Sign In box. You will see the New Member Sign Up page. 3. Enter your [employer] Access Code exactly as it appears below. You will not need to use this code after youve completed the sign-up process. If you do not sign up before the expiration date, you must request a new code. · [employer] Access Code: [email or web address] Expires: 6/1/2017 10:29 AM 
 
4. Enter the last four digits of your Social Security Number (xxxx) and Date of Birth (mm/dd/yyyy) as indicated and click Submit. You will be taken to the next sign-up page. 5. Create a [employer] ID. This will be your [employer] login ID and cannot be changed, so think of one that is secure and easy to remember. 6. Create a [employer] password. You can change your password at any time. 7. Enter your Password Reset Question and Answer. This can be used at a later time if you forget your password. 8. Enter your e-mail address. You will receive e-mail notification when new information is available in [address] 9. Click Sign Up. You can now view and download portions of your medical record. 10. Click the Download Summary menu link to download a portable copy of your medical information. If you have questions, please visit the Frequently Asked Questions section of the [employer] website. Remember, [employer] is NOT to be used for urgent needs. For medical emergencies, dial 911. Now available from your iPhone and Android! Please provide this summary of care documentation to your next provider. Your primary care clinician is listed as Melinda Mcduffie. If you have any questions after today's visit, please call [phone].

## 2017-04-19 NOTE — LETTER
4/19/2017 9:20 AM 
 
Ms. Ruby Garcia [phone] Dear Ruby Franklin: Please find your most recent results below. Discussed in office. Resulted Orders CBC W/O DIFF Result Value Ref Range WBC 6.4 3.4 - 10.8 x10E3/uL  
 RBC 4.59 3.77 - 5.28 x10E6/uL HGB 12.5 11.1 - 15.9 g/dL HCT 37.7 34.0 - 46.6 % MCV 82 79 - 97 fL  
 MCH 27.2 26.6 - 33.0 pg  
 MCHC 33.2 31.5 - 35.7 g/dL  
 RDW 15.0 12.3 - 15.4 % PLATELET 544 624 - 098 x10E3/uL Narrative Performed at:  [address]  [id number] : Pearl Zhang MD, Phone:  [phone] HEMOGLOBIN A1C W/O EAG Result Value Ref Range Hemoglobin A1c 6.1 (H) 4.8 - 5.6 % Comment:  
            Pre-diabetes: 5.7 - 6.4 Diabetes: >6.4 Glycemic control for adults with diabetes: <7.0 Narrative Performed at:  [address]  [id number] : Pearl Zhang MD, Phone:  [phone] LIPID PANEL Result Value Ref Range Cholesterol, total 199 100 - 199 mg/dL Triglyceride 157 (H) 0 - 149 mg/dL HDL Cholesterol 65 >39 mg/dL VLDL, calculated 31 5 - 40 mg/dL LDL, calculated 103 (H) 0 - 99 mg/dL Narrative Performed at:  [address]  [id number] : Pearl Zhang MD, Phone:  [phone] METABOLIC PANEL, COMPREHENSIVE Result Value Ref Range Glucose 89 65 - 99 mg/dL BUN 9 6 - 24 mg/dL Creatinine 0.69 0.57 - 1.00 mg/dL GFR est non- >59 mL/min/1.73 GFR est  >59 mL/min/1.73  
 BUN/Creatinine ratio 13 9 - 23 Sodium 138 134 - 144 mmol/L Potassium 4.0 3.5 - 5.2 mmol/L Chloride 100 96 - 106 mmol/L  
 CO2 23 18 - 29 mmol/L Calcium 9.4 8.7 - 10.2 mg/dL Protein, total 7.2 6.0 - 8.5 g/dL Albumin 4.5 3.5 - 5.5 g/dL GLOBULIN, TOTAL 2.7 1.5 - 4.5 g/dL A-G Ratio 1.7 1.2 - 2.2 Comment: **Please note reference interval change** Bilirubin, total 0.3 0.0 - 1.2 mg/dL Alk. phosphatase 46 39 - 117 IU/L  
 AST (SGOT) 17 0 - 40 IU/L  
 ALT (SGPT) 20 0 - 32 IU/L Narrative Performed at:  [address]  [id number] : Lupe Gregory MD, Phone:  [phone] TSH RFX ON ABNORMAL TO FREE T4 Result Value Ref Range TSH 1.400 0.450 - 4.500 uIU/mL Narrative Performed at:  [address]  [id number] : Lupe Gregory MD, Phone:  [phone] URINALYSIS W/ RFLX MICROSCOPIC Result Value Ref Range Specific Gravity 1.010 1.005 - 1.030  
 pH (UA) 7.0 5.0 - 7.5 Color Yellow Yellow Appearance Clear Clear Leukocyte Esterase Negative Negative Protein Negative Negative/Trace Glucose Negative Negative Ketone Negative Negative Blood Negative Negative Bilirubin Negative Negative Urobilinogen 0.2 0.2 - 1.0 mg/dL Nitrites Negative Negative Microscopic Examination Comment Comment:  
   Microscopic not indicated and not performed. Narrative Performed at:  [address]  [id number] : Lupe Gregory MD, Phone:  [phone] RECOMMENDATIONS: 
Work on diet and exercise. Please call me if you have any questions: [phone] Sincerely, Michael Jay MD

## 2017-04-19 NOTE — PROGRESS NOTES
Caroline Vasquez is a 38 y.o. female    3rd visit with this physician. Prediabetes, info given on diet and lifestyle. Vit D deficiency. Will write for [id number] Q7days. Reviewed: active problem list, medication list, allergies, notes from last encounter    A comprehensive review of systems was negative except for that written in the HPI. Labs reviewed with patient. Results for orders placed or performed in visit on 03/17/17   CBC W/O DIFF   Result Value Ref Range    WBC 6.4 3.4 - 10.8 x10E3/uL    RBC 4.59 3.77 - 5.28 x10E6/uL    HGB 12.5 11.1 - 15.9 g/dL    HCT 37.7 34.0 - 46.6 %    MCV 82 79 - 97 fL    MCH 27.2 26.6 - 33.0 pg    MCHC 33.2 31.5 - 35.7 g/dL    RDW 15.0 12.3 - 15.4 %    PLATELET 361 148 - 760 x10E3/uL   HEMOGLOBIN A1C W/O EAG   Result Value Ref Range    Hemoglobin A1c 6.1 (H) 4.8 - 5.6 %   LIPID PANEL   Result Value Ref Range    Cholesterol, total 199 100 - 199 mg/dL    Triglyceride 157 (H) 0 - 149 mg/dL    HDL Cholesterol 65 >39 mg/dL    VLDL, calculated 31 5 - 40 mg/dL    LDL, calculated 103 (H) 0 - 99 mg/dL   METABOLIC PANEL, COMPREHENSIVE   Result Value Ref Range    Glucose 89 65 - 99 mg/dL    BUN 9 6 - 24 mg/dL    Creatinine 0.69 0.57 - 1.00 mg/dL    GFR est non- >59 mL/min/1.73    GFR est  >59 mL/min/1.73    BUN/Creatinine ratio 13 9 - 23    Sodium 138 134 - 144 mmol/L    Potassium 4.0 3.5 - 5.2 mmol/L    Chloride 100 96 - 106 mmol/L    CO2 23 18 - 29 mmol/L    Calcium 9.4 8.7 - 10.2 mg/dL    Protein, total 7.2 6.0 - 8.5 g/dL    Albumin 4.5 3.5 - 5.5 g/dL    GLOBULIN, TOTAL 2.7 1.5 - 4.5 g/dL    A-G Ratio 1.7 1.2 - 2.2    Bilirubin, total 0.3 0.0 - 1.2 mg/dL    Alk.  phosphatase 46 39 - 117 IU/L    AST (SGOT) 17 0 - 40 IU/L    ALT (SGPT) 20 0 - 32 IU/L   TSH RFX ON ABNORMAL TO FREE T4   Result Value Ref Range    TSH 1.400 0.450 - 4.500 uIU/mL   URINALYSIS W/ RFLX MICROSCOPIC   Result Value Ref Range    Specific Gravity 1.010 1.005 - 1.030    pH (UA) 7.0 5.0 - 7.5 Color Yellow Yellow    Appearance Clear Clear    Leukocyte Esterase Negative Negative    Protein Negative Negative/Trace    Glucose Negative Negative    Ketone Negative Negative    Blood Negative Negative    Bilirubin Negative Negative    Urobilinogen 0.2 0.2 - 1.0 mg/dL    Nitrites Negative Negative    Microscopic Examination Comment        Allergies   Allergen Reactions    Banana Other (comments)     GERD    Macrobid [Nitrofurantoin Monohyd/M-Cryst] Hives     Current Outpatient Prescriptions on File Prior to Visit   Medication Sig Dispense Refill    cholecalciferol (VITAMIN D3) 1,000 unit cap Take  by mouth daily.  fluticasone (FLONASE) 50 mcg/actuation nasal spray 2 Sprays by Both Nostrils route daily. 1 Bottle 1    omeprazole (PRILOSEC) 20 mg capsule Take 1 Cap by mouth daily. 30 Cap 2    montelukast (SINGULAIR) 10 mg tablet Take 1 Tab by mouth daily. 30 Tab 1    cetirizine (ZYRTEC) 10 mg tablet Take 1 Tab by mouth daily. 30 Tab 1     No current facility-administered medications on file prior to visit. Patient Active Problem List   Diagnosis Code    Ganglion of tendon sheath M67.40    Annual physical exam Z00.00    Family history of diabetes mellitus Z83.3    Encounter for long-term (current) use of medications Z79.899    IUD (intrauterine device) in place Z97.5    BMI 30.0-30.9,adult Z68.30    Iron deficiency E61.1    Pre-diabetes R73.03    Vitamin D deficiency E55.9       Visit Vitals    /64 (BP 1 Location: Left arm, BP Patient Position: Sitting)    Pulse 76    Temp 97.8 °F (36.6 °C) (Oral)    Resp 14    Ht 5' 2\" (1.575 m)    Wt 169 lb 12.8 oz (77 kg)    LMP 03/29/2017    SpO2 99%    BMI 31.06 kg/m2     General appearance: alert, cooperative, no distress, appears stated age  Head: Normocephalic, without obvious abnormality, atraumatic  Eyes: conjunctivae/corneas clear. PERRL, EOM's intact.    Lungs: clear to auscultation bilaterally  Heart: regular rate and rhythm, S1, S2 normal, no murmur, click, rub or gallop  Abdomen: soft, non-tender. Extremities: extremities normal, atraumatic, no cyanosis or edema      Assessment/Plans:    Mary Erickson was seen today for results. Diagnoses and all orders for this visit:    Pre-diabetes    Vitamin D deficiency  -     ergocalciferol (ERGOCALCIFEROL) 50,000 unit capsule; Take 1 Cap by mouth every seven (7) days. Discussed plans, risk/benefits of treatments/observations. Through the use of shared decision making, above plans were agreed upon. Medication compliance advised. Patient verbalized understanding. Follow-up Disposition:  Return in about 6 months (around 10/19/2017) for prediabetes, chronic care.       Stacy Covarrubias MD  4/19/2017

## 2017-05-19 ENCOUNTER — OFFICE VISIT (OUTPATIENT)
Dept: FAMILY MEDICINE CLINIC | Age: 42
End: 2017-05-19

## 2017-05-19 VITALS
WEIGHT: 162.2 LBS | SYSTOLIC BLOOD PRESSURE: 112 MMHG | OXYGEN SATURATION: 100 % | DIASTOLIC BLOOD PRESSURE: 68 MMHG | HEART RATE: 68 BPM | RESPIRATION RATE: 16 BRPM | BODY MASS INDEX: 29.85 KG/M2 | HEIGHT: 62 IN | TEMPERATURE: 97.8 F

## 2017-05-19 DIAGNOSIS — Z23 ENCOUNTER FOR IMMUNIZATION: Primary | ICD-10-CM

## 2017-05-19 DIAGNOSIS — J30.89 ALLERGIC RHINITIS DUE TO OTHER ALLERGEN: ICD-10-CM

## 2017-05-19 DIAGNOSIS — K21.9 GASTROESOPHAGEAL REFLUX DISEASE WITHOUT ESOPHAGITIS: ICD-10-CM

## 2017-05-19 RX ORDER — OMEPRAZOLE 20 MG/1
20 CAPSULE, DELAYED RELEASE ORAL DAILY
Qty: 30 CAP | Refills: 2 | Status: SHIPPED | OUTPATIENT
Start: 2017-05-19 | End: 2019-02-27

## 2017-05-19 RX ORDER — CETIRIZINE HCL 10 MG
10 TABLET ORAL DAILY
Qty: 30 TAB | Refills: 1 | Status: SHIPPED | OUTPATIENT
Start: 2017-05-19 | End: 2019-02-06

## 2017-05-19 NOTE — PROGRESS NOTES
Chief Complaint   Patient presents with    Allergies     F/U    Immunization/Injection     3RD Hep c vacine

## 2017-05-19 NOTE — MR AVS SNAPSHOT
Visit Information Date & Time Provider Department Dept. Phone Encounter #  
 5/19/2017  8:45 AM Melinda Mcduffie MD [facility] at [address] [id number] Follow-up Instructions Return for as needed. Upcoming Health Maintenance Date Due INFLUENZA AGE 9 TO ADULT 8/1/2017 PAP AKA CERVICAL CYTOLOGY 1/26/2020 COLONOSCOPY 5/16/2021 DTaP/Tdap/Td series (2 - Td) 3/17/2027 Allergies as of 5/19/2017  Review Complete On: 5/19/2017 By: Melinda Mcduffie MD  
  
 Severity Noted Reaction Type Reactions Banana  03/31/2011    Other (comments) GERD Macrobid [Nitrofurantoin Monohyd/m-cryst]  03/31/2011    Hives Current Immunizations  Reviewed on 10/25/2011 Name Date Hep A and Hep B Vaccine 3/17/2017 Hep B Vaccine (Adult)  Incomplete Hepatitis B Vaccine 10/25/2011 10:40 AM  
 Influenza Vaccine Split 10/25/2011 10:42 AM  
 PPD 8/8/2011 TD Vaccine 10/25/2011 10:39 AM  
  
 Not reviewed this visit You Were Diagnosed With   
  
 Codes Comments Encounter for immunization    -  Primary ICD-10-CM: S92 ICD-9-CM: V03.89 Allergic rhinitis due to other allergen     ICD-10-CM: J30.89 ICD-9-CM: 477.8 Gastroesophageal reflux disease without esophagitis     ICD-10-CM: K21.9 ICD-9-CM: 530.81 Vitals BP Pulse Temp Resp Height(growth percentile) Weight(growth percentile) 112/68 (BP 1 Location: Left arm, BP Patient Position: Sitting) 68 97.8 °F (36.6 °C) (Oral) 16 5' 2\" (1.575 m) 162 lb 3.2 oz (73.6 kg) LMP SpO2 BMI OB Status Smoking Status 05/15/2017 100% 29.67 kg/m2 Having regular periods Never Smoker BMI and BSA Data Body Mass Index Body Surface Area  
 29.67 kg/m 2 1.79 m 2 Preferred Pharmacy Pharmacy Name Phone [address] [facility], [address] [facility] [phone] Your Updated Medication List  
  
   
 This list is accurate as of: 5/19/17  9:22 AM.  Always use your most recent med list.  
  
  
  
  
 cetirizine 10 mg tablet Commonly known as:  ZYRTEC Take 1 Tab by mouth daily. ergocalciferol 50,000 unit capsule Commonly known as:  ERGOCALCIFEROL Take 1 Cap by mouth every seven (7) days. fluticasone 50 mcg/actuation nasal spray Commonly known as:  [place] 2 Sprays by Both Nostrils route daily. montelukast 10 mg tablet Commonly known as:  SINGULAIR Take 1 Tab by mouth daily. omeprazole 20 mg capsule Commonly known as:  PRILOSEC Take 1 Cap by mouth daily. VITAMIN D3 1,000 unit Cap Generic drug:  cholecalciferol Take  by mouth daily. Prescriptions Sent to Pharmacy Refills  
 cetirizine (ZYRTEC) 10 mg tablet 1 Sig: Take 1 Tab by mouth daily. Class: Normal  
 Pharmacy: [facility], [address] [place] [address] RD AT [address] [place] [address] Ph #: [phone] Route: Oral  
 omeprazole (PRILOSEC) 20 mg capsule 2 Sig: Take 1 Cap by mouth daily. Class: Normal  
 Pharmacy: [facility], [address] [place] [address] RD AT [address] [place] [address] Ph #: [phone] Route: Oral  
  
We Performed the Following HEPATITIS B VACCINE, ADULT DOSAGE, IM [07811 CPT(R)] Follow-up Instructions Return for as needed. Introducing [facility] SERVICES! Phoebe Ta introduces [employer] patient portal. Now you can access parts of your medical record, email your doctor's office, and request medication refills online. 1. In your internet browser, go to [email or web address] 2. Click on the First Time User? Click Here link in the Sign In box. You will see the New Member Sign Up page. 3. Enter your [employer] Access Code exactly as it appears below. You will not need to use this code after youve completed the sign-up process.  If you do not sign up before the expiration date, you must request a new code. · [employer] Access Code: [email or web address] Expires: 6/1/2017 10:29 AM 
 
4. Enter the last four digits of your Social Security Number (xxxx) and Date of Birth (mm/dd/yyyy) as indicated and click Submit. You will be taken to the next sign-up page. 5. Create a [employer] ID. This will be your [employer] login ID and cannot be changed, so think of one that is secure and easy to remember. 6. Create a [employer] password. You can change your password at any time. 7. Enter your Password Reset Question and Answer. This can be used at a later time if you forget your password. 8. Enter your e-mail address. You will receive e-mail notification when new information is available in [address] 9. Click Sign Up. You can now view and download portions of your medical record. 10. Click the Download Summary menu link to download a portable copy of your medical information. If you have questions, please visit the Frequently Asked Questions section of the [employer] website. Remember, [employer] is NOT to be used for urgent needs. For medical emergencies, dial 911. Now available from your iPhone and Android! Please provide this summary of care documentation to your next provider. Your primary care clinician is listed as Dominick Hay. If you have any questions after today's visit, please call [phone].

## 2017-05-19 NOTE — PROGRESS NOTES
Patient Identification  West Bills is a 38 y.o. female. Allergies (F/U) and Immunization/Injection (3RD Hep c vacine)    Allergies: Is now on singulair, flonase and zyrtec. She is doing much better, a bit of allergies that fluctuates with the weather. Denies fever, chills, illness, myalgia. Otherwise is happy with course. 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 Past Medical History:   Diagnosis Date    Annual physical exam 3/17/2017    BMI 30.0-30.9,adult 3/17/2017    Encounter for long-term (current) use of medications 3/17/2017    Family history of diabetes mellitus 3/17/2017    IBS (irritable bowel syndrome)     Iron deficiency 3/17/2017    IUD (intrauterine device) in place 3/17/2017    Pre-diabetes 4/19/2017    Vitamin D deficiency 4/19/2017     Family History   Problem Relation Age of Onset    Asthma Mother     Hypertension Father     Diabetes Father     Elevated Lipids Father     Cancer Sister      Current Outpatient Prescriptions   Medication Sig Dispense Refill    cetirizine (ZYRTEC) 10 mg tablet Take 1 Tab by mouth daily. 30 Tab 1    omeprazole (PRILOSEC) 20 mg capsule Take 1 Cap by mouth daily. 30 Cap 2    ergocalciferol (ERGOCALCIFEROL) 50,000 unit capsule Take 1 Cap by mouth every seven (7) days. 12 Cap 0    montelukast (SINGULAIR) 10 mg tablet Take 1 Tab by mouth daily. 30 Tab 1    fluticasone (FLONASE) 50 mcg/actuation nasal spray 2 Sprays by Both Nostrils route daily. 1 Bottle 1    cholecalciferol (VITAMIN D3) 1,000 unit cap Take  by mouth daily. Allergies   Allergen Reactions    Banana Other (comments)     GERD    Macrobid [Nitrofurantoin Monohyd/M-Cryst] Hives     Social History     Social History    Marital status: SINGLE     Spouse name: N/A    Number of children: N/A    Years of education: N/A     Occupational History    Not on file.      Social History Main Topics    Smoking status: Never Smoker    Smokeless tobacco: Never Used    Alcohol use No    Drug use: No    Sexual activity: Yes     Partners: Male     Birth control/ protection: IUD     Other Topics Concern    Not on file     Social History Narrative     Review of Systems  Pertinent items are noted in HPI. Physical Exam     Visit Vitals    /68 (BP 1 Location: Left arm, BP Patient Position: Sitting)    Pulse 68    Temp 97.8 °F (36.6 °C) (Oral)    Resp 16    Ht 5' 2\" (1.575 m)    Wt 162 lb 3.2 oz (73.6 kg)    LMP 05/15/2017    SpO2 100%    BMI 29.67 kg/m2     General appearance: alert, cooperative, no distress, appears stated age  Head: Normocephalic, without obvious abnormality, atraumatic  Eyes: conjunctivae/corneas clear. PERRL, EOM's intact  Lungs: clear to auscultation bilaterally  Heart: regular rate and rhythm, S1, S2 normal, no murmur, click, rub or gallop  Abdomen: soft, non-tender. Bowel sounds normal. No masses,  no organomegaly  Extremities: extremities normal, atraumatic, no cyanosis or edema  Neurologic: Alert and oriented X 3, normal strength and tone. Normal symmetric reflexes. Normal coordination and gait        ASSESSMENT/PLANS    Neha Luna was seen today for allergies and immunization/injection. Diagnoses and all orders for this visit:    Encounter for immunization  -     Hepatitis B vaccine, adult dosage, IM    Allergic rhinitis due to other allergen  -     cetirizine (ZYRTEC) 10 mg tablet; Take 1 Tab by mouth daily. Gastroesophageal reflux disease without esophagitis  -     omeprazole (PRILOSEC) 20 mg capsule; Take 1 Cap by mouth daily. Follow-up Disposition:  Return for as needed.       Anabel Bullock MD  5/19/2017

## 2017-06-26 PROBLEM — Z87.59 HX OF ONE MISCARRIAGE: Status: ACTIVE | Noted: 2017-06-26

## 2017-06-26 PROBLEM — N85.2 ENLARGED UTERUS: Status: ACTIVE | Noted: 2017-06-26

## 2017-06-26 PROBLEM — Z87.59 HX OF ABORTION: Status: ACTIVE | Noted: 2017-06-26

## 2019-02-06 ENCOUNTER — OFFICE VISIT (OUTPATIENT)
Dept: FAMILY MEDICINE CLINIC | Age: 44
End: 2019-02-06

## 2019-02-06 VITALS
DIASTOLIC BLOOD PRESSURE: 62 MMHG | HEART RATE: 71 BPM | WEIGHT: 166.6 LBS | RESPIRATION RATE: 16 BRPM | BODY MASS INDEX: 30.66 KG/M2 | OXYGEN SATURATION: 100 % | TEMPERATURE: 97.9 F | SYSTOLIC BLOOD PRESSURE: 113 MMHG | HEIGHT: 62 IN

## 2019-02-06 DIAGNOSIS — Z13.220 SCREENING CHOLESTEROL LEVEL: ICD-10-CM

## 2019-02-06 DIAGNOSIS — Z11.3 SCREEN FOR STD (SEXUALLY TRANSMITTED DISEASE): ICD-10-CM

## 2019-02-06 DIAGNOSIS — Z00.00 ANNUAL PHYSICAL EXAM: Primary | ICD-10-CM

## 2019-02-06 DIAGNOSIS — R73.03 PRE-DIABETES: ICD-10-CM

## 2019-02-06 DIAGNOSIS — B00.2 ORAL HERPES: ICD-10-CM

## 2019-02-06 DIAGNOSIS — D17.1 LIPOMA OF TORSO: ICD-10-CM

## 2019-02-06 RX ORDER — VALACYCLOVIR HYDROCHLORIDE 1 G/1
1000 TABLET, FILM COATED ORAL 2 TIMES DAILY
Qty: 14 TAB | Refills: 0 | Status: SHIPPED | OUTPATIENT
Start: 2019-02-06 | End: 2019-02-13

## 2019-02-06 NOTE — PROGRESS NOTES
Chief Complaint Patient presents with  Complete Physical  
 
Sees GYN 1. Have you been to the ER, urgent care clinic since your last visit? Hospitalized since your last visit? no 
 
2. Have you seen or consulted any other health care providers outside of the [address] [place] [address] since your last visit? Include any pap smears or colon screening. yes

## 2019-02-06 NOTE — PROGRESS NOTES
Subjective:  
41 y.o. female for annual routine checkup. Patient's last menstrual period was 2019 (exact date). Went to [facility]. Had Papsmear and mammogram recently . Her  was cheating on her. She also have a new sexual partners. Denies GYN symptoms or rash. But have lips ulcer. Patient Active Problem List  
 Diagnosis Date Noted  Hx of one miscarriage 2017  Hx of  2017  Enlarged uterus 2017  Pre-diabetes 2017  Vitamin D deficiency 2017  Annual physical exam 2017  Family history of diabetes mellitus 2017  Encounter for long-term (current) use of medications 2017  IUD (intrauterine device) in place 2017  BMI 30.0-30.9,adult 2017  Iron deficiency 2017  Ganglion of tendon sheath 2013 Current Outpatient Medications Medication Sig Dispense Refill  valACYclovir (VALTREX) 1 gram tablet Take 1 Tab by mouth two (2) times a day for 7 days. 14 Tab 0  cholecalciferol (VITAMIN D3) 1,000 unit cap Take  by mouth daily.  omeprazole (PRILOSEC) 20 mg capsule Take 1 Cap by mouth daily. 30 Cap 2  
 ergocalciferol (ERGOCALCIFEROL) 50,000 unit capsule Take 1 Cap by mouth every seven (7) days. 12 Cap 0  
 fluticasone (FLONASE) 50 mcg/actuation nasal spray 2 Sprays by Both Nostrils route daily. 1 Bottle 1 Allergies Allergen Reactions  Banana Other (comments) GERD  
 Macrobid [Nitrofurantoin Monohyd/M-Cryst] Hives History reviewed. No pertinent surgical history. Family History Problem Relation Age of Onset  Asthma Mother  Hypertension Father  Diabetes Father  Elevated Lipids Father  Cancer Sister Social History Tobacco Use  Smoking status: Never Smoker  Smokeless tobacco: Never Used Substance Use Topics  Alcohol use: No  
  
 
ROS:  Feeling well. No dyspnea or chest pain on exertion.   No abdominal pain, change in bowel habits, black or bloody stools. No urinary tract symptoms. GYN ROS: normal menses, no abnormal bleeding, pelvic pain or discharge, no breast pain or new or enlarging lumps on self exam. No neurological complaints. Objective:  
 
Visit Vitals /62 Pulse 71 Temp 97.9 °F (36.6 °C) (Oral) Resp 16 Ht 5' 2\" (1.575 m) Wt 166 lb 9.6 oz (75.6 kg) LMP 01/30/2019 (Exact Date) SpO2 100% BMI 30.47 kg/m² General:  Alert, cooperative, no distress, appears stated age. Head:  Normocephalic, without obvious abnormality, atraumatic. Eyes:  Conjunctivae/corneas clear. PERRL, EOMs intact. Ears:  Normal TMs and external ear canals both ears. Throat: Lip upper 3 ulcers. normal mucosa, and tongue normal. Teeth and gums normal.  
Neck: Supple, symmetrical, trachea midline, no carotid bruit and no JVD. Lungs:   Clear to auscultation bilaterally. Heart:  Regular rate and rhythm, S1, S2 normal, no murmur, click, rub or gallop. Abdomen:   Soft, non-tender. Lipoma left lower 12th rib location Extremities: Extremities normal, atraumatic, no cyanosis or edema. Pulses: 2+ and symmetric all extremities. Neurologic: CNII-XII intact. Normal strength, sensation and reflexes throughout. Assessment/Plan:  
 
Diagnoses and all orders for this visit: 
 
1. Annual physical exam 
-     CBC W/O DIFF 
-     HEMOGLOBIN A1C W/O EAG 
-     LIPID PANEL 
-     METABOLIC PANEL, COMPREHENSIVE 
-     TSH RFX ON ABNORMAL TO FREE T4 
-     HIV 1/2 AG/AB, 4TH GENERATION,W RFLX CONFIRM 
-     HSV 1/2 AB, IGM 2. Pre-diabetes 
-     HEMOGLOBIN A1C W/O EAG 3. Screening cholesterol level -     LIPID PANEL 4. Screen for STD (sexually transmitted disease) 
-     HIV 1/2 AG/AB, 4TH GENERATION,W RFLX CONFIRM 
-     HSV 1/2 AB, IGM 5. Oral herpes 
-     valACYclovir (VALTREX) 1 gram tablet; Take 1 Tab by mouth two (2) times a day for 7 days. 6. Lipoma of torso -      ABD LTD; Future Follow-up Disposition: 
Return in about 1 week (around 2/13/2019) for labs review and US. Bobby Gordillo MD 
2/6/2019

## 2019-02-15 ENCOUNTER — HOSPITAL ENCOUNTER (OUTPATIENT)
Dept: ULTRASOUND IMAGING | Age: 44
Discharge: HOME | End: 2019-02-15
Attending: FAMILY MEDICINE
Payer: MEDICAID

## 2019-02-15 DIAGNOSIS — D17.1 LIPOMA OF TORSO: ICD-10-CM

## 2019-02-15 PROCEDURE — 76705 ECHO EXAM OF ABDOMEN: CPT

## 2019-02-19 LAB
ALBUMIN SERPL-MCNC: 4.5 G/DL (ref 3.5–5.5)
ALBUMIN/GLOB SERPL: 1.6 {RATIO} (ref 1.2–2.2)
ALP SERPL-CCNC: 50 IU/L (ref 39–117)
ALT SERPL-CCNC: 30 IU/L (ref 0–32)
AST SERPL-CCNC: 21 IU/L (ref 0–40)
BILIRUB SERPL-MCNC: 0.2 MG/DL (ref 0–1.2)
BUN SERPL-MCNC: 9 MG/DL (ref 6–24)
BUN/CREAT SERPL: 13 (ref 9–23)
CALCIUM SERPL-MCNC: 9.4 MG/DL (ref 8.7–10.2)
CHLORIDE SERPL-SCNC: 102 MMOL/L (ref 96–106)
CHOLEST SERPL-MCNC: 177 MG/DL (ref 100–199)
CO2 SERPL-SCNC: 21 MMOL/L (ref 20–29)
CREAT SERPL-MCNC: 0.72 MG/DL (ref 0.57–1)
ERYTHROCYTE [DISTWIDTH] IN BLOOD BY AUTOMATED COUNT: 14.7 % (ref 12.3–15.4)
GLOBULIN SER CALC-MCNC: 2.9 G/DL (ref 1.5–4.5)
GLUCOSE SERPL-MCNC: 94 MG/DL (ref 65–99)
HBA1C MFR BLD: 5.7 % (ref 4.8–5.6)
HCT VFR BLD AUTO: 37.3 % (ref 34–46.6)
HDLC SERPL-MCNC: 57 MG/DL
HGB BLD-MCNC: 12.1 G/DL (ref 11.1–15.9)
HIV 1+2 AB+HIV1 P24 AG SERPL QL IA: NON REACTIVE
HSV1+2 IGM SER IA-ACNC: <0.91 RATIO (ref 0–0.9)
LDLC SERPL CALC-MCNC: 99 MG/DL (ref 0–99)
MCH RBC QN AUTO: 27.2 PG (ref 26.6–33)
MCHC RBC AUTO-ENTMCNC: 32.4 G/DL (ref 31.5–35.7)
MCV RBC AUTO: 84 FL (ref 79–97)
PLATELET # BLD AUTO: 301 X10E3/UL (ref 150–379)
POTASSIUM SERPL-SCNC: 4.2 MMOL/L (ref 3.5–5.2)
PROT SERPL-MCNC: 7.4 G/DL (ref 6–8.5)
RBC # BLD AUTO: 4.45 X10E6/UL (ref 3.77–5.28)
SODIUM SERPL-SCNC: 139 MMOL/L (ref 134–144)
TRIGL SERPL-MCNC: 103 MG/DL (ref 0–149)
TSH SERPL DL<=0.005 MIU/L-ACNC: 1.09 UIU/ML (ref 0.45–4.5)
VLDLC SERPL CALC-MCNC: 21 MG/DL (ref 5–40)
WBC # BLD AUTO: 5.5 X10E3/UL (ref 3.4–10.8)

## 2019-02-21 ENCOUNTER — TELEPHONE (OUTPATIENT)
Dept: FAMILY MEDICINE CLINIC | Age: 44
End: 2019-02-21

## 2019-02-21 NOTE — TELEPHONE ENCOUNTER
Patient called, wanting to know if you can give her test results over the telephone    Please call [phone]

## 2019-02-25 ENCOUNTER — TELEPHONE (OUTPATIENT)
Dept: FAMILY MEDICINE CLINIC | Age: 44
End: 2019-02-25

## 2019-02-25 NOTE — TELEPHONE ENCOUNTER
----- Message from Mona Mendoza sent at 2/25/2019  4:01 PM EST -----  Regarding: Dr. Carla Fischer  Pt is requesting a call, regarding lab paperwork and Ultrasound information. Best contact number [phone].

## 2019-02-26 ENCOUNTER — TELEPHONE (OUTPATIENT)
Dept: FAMILY MEDICINE CLINIC | Age: 44
End: 2019-02-26

## 2019-02-26 NOTE — TELEPHONE ENCOUNTER
----- Message from Krystina Boboo sent at 2/26/2019  9:57 AM EST -----  Regarding: Dr. Hero Chaney   Pt is inquiring about a ultrasound and she would like to discuss the results with the PCP.   Best contact number is [phone]

## 2019-02-27 ENCOUNTER — OFFICE VISIT (OUTPATIENT)
Dept: FAMILY MEDICINE CLINIC | Age: 44
End: 2019-02-27

## 2019-02-27 VITALS
BODY MASS INDEX: 30.55 KG/M2 | SYSTOLIC BLOOD PRESSURE: 101 MMHG | OXYGEN SATURATION: 100 % | WEIGHT: 166 LBS | DIASTOLIC BLOOD PRESSURE: 62 MMHG | HEART RATE: 73 BPM | RESPIRATION RATE: 18 BRPM | HEIGHT: 62 IN | TEMPERATURE: 97.9 F

## 2019-02-27 DIAGNOSIS — R73.03 PREDIABETES: ICD-10-CM

## 2019-02-27 DIAGNOSIS — D17.1 LIPOMA OF TORSO: Primary | ICD-10-CM

## 2019-02-27 PROBLEM — Z00.00 ANNUAL PHYSICAL EXAM: Status: RESOLVED | Noted: 2017-03-17 | Resolved: 2019-02-27

## 2019-02-27 PROBLEM — Z83.3 FAMILY HISTORY OF DIABETES MELLITUS: Status: RESOLVED | Noted: 2017-03-17 | Resolved: 2019-02-27

## 2019-02-27 NOTE — PATIENT INSTRUCTIONS
Body Mass Index: Care Instructions Your Care Instructions Body mass index (BMI) can help you see if your weight is raising your risk for health problems. It uses a formula to compare how much you weigh with how tall you are. · A BMI lower than 18.5 is considered underweight. · A BMI between 18.5 and 24.9 is considered healthy. · A BMI between 25 and 29.9 is considered overweight. A BMI of 30 or higher is considered obese. If your BMI is in the normal range, it means that you have a lower risk for weight-related health problems. If your BMI is in the overweight or obese range, you may be at increased risk for weight-related health problems, such as high blood pressure, heart disease, stroke, arthritis or joint pain, and diabetes. If your BMI is in the underweight range, you may be at increased risk for health problems such as fatigue, lower protection (immunity) against illness, muscle loss, bone loss, hair loss, and hormone problems. BMI is just one measure of your risk for weight-related health problems. You may be at higher risk for health problems if you are not active, you eat an unhealthy diet, or you drink too much alcohol or use tobacco products. Follow-up care is a key part of your treatment and safety. Be sure to make and go to all appointments, and call your doctor if you are having problems. It's also a good idea to know your test results and keep a list of the medicines you take. How can you care for yourself at home? · Practice healthy eating habits. This includes eating plenty of fruits, vegetables, whole grains, lean protein, and low-fat dairy. · If your doctor recommends it, get more exercise. Walking is a good choice. Bit by bit, increase the amount you walk every day. Try for at least 30 minutes on most days of the week. · Do not smoke. Smoking can increase your risk for health problems.  If you need help quitting, talk to your doctor about stop-smoking programs and medicines. These can increase your chances of quitting for good. · Limit alcohol to 2 drinks a day for men and 1 drink a day for women. Too much alcohol can cause health problems. If you have a BMI higher than 25 · Your doctor may do other tests to check your risk for weight-related health problems. This may include measuring the distance around your waist. A waist measurement of more than 40 inches in men or 35 inches in women can increase the risk of weight-related health problems. · Talk with your doctor about steps you can take to stay healthy or improve your health. You may need to make lifestyle changes to lose weight and stay healthy, such as changing your diet and getting regular exercise. If you have a BMI lower than 18.5 · Your doctor may do other tests to check your risk for health problems. · Talk with your doctor about steps you can take to stay healthy or improve your health. You may need to make lifestyle changes to gain or maintain weight and stay healthy, such as getting more healthy foods in your diet and doing exercises to build muscle. Where can you learn more? Go to http://judson-giovanni.info/. Enter S176 in the search box to learn more about \"Body Mass Index: Care Instructions. \" Current as of: October 13, 2016 Content Version: 11.4 © 7758-0914 [employer]wise[employer]. Care instructions adapted under license by [employer] Â® (which disclaims liability or warranty for this information). If you have questions about a medical condition or this instruction, always ask your healthcare professional. [address] any warranty or liability for your use of this information.

## 2019-02-27 NOTE — PROGRESS NOTES
Chief Complaint Patient presents with  Results 1. Have you been to the ER, urgent care clinic since your last visit? Hospitalized since your last visit? no 
 
2. Have you seen or consulted any other health care providers outside of the [address] [place] [address] since your last visit? Include any pap smears or colon screening. yes

## 2019-02-27 NOTE — PROGRESS NOTES
Ale Liu is a 41 y.o. female 
 
 has a past medical history of BMI 30.0-30.9,adult (3/17/2017), IBS (irritable bowel syndrome), IUD (intrauterine device) in place (3/17/2017), Pre-diabetes (4/19/2017), and Vitamin D deficiency (4/19/2017). Lipoma: refer to gen surg. Prediabetes A1C 5.7%. BMI 30, she is working out, have a . Discussed the patient's BMI with her. The BMI follow up plan is as follows:  
dietary management education, guidance, and counseling encourage exercise monitor weight prescribed dietary intake Reviewed: active problem list, medication list, allergies, notes from last encounter, lab results, imaging A comprehensive review of systems was negative except for that written in the HPI. Results for orders placed or performed in visit on 02/06/19 CBC W/O DIFF Result Value Ref Range WBC 5.5 3.4 - 10.8 x10E3/uL  
 RBC 4.45 3.77 - 5.28 x10E6/uL HGB 12.1 11.1 - 15.9 g/dL HCT 37.3 34.0 - 46.6 % MCV 84 79 - 97 fL  
 MCH 27.2 26.6 - 33.0 pg  
 MCHC 32.4 31.5 - 35.7 g/dL  
 RDW 14.7 12.3 - 15.4 % PLATELET 176 483 - 738 x10E3/uL HEMOGLOBIN A1C W/O EAG Result Value Ref Range Hemoglobin A1c 5.7 (H) 4.8 - 5.6 % LIPID PANEL Result Value Ref Range Cholesterol, total 177 100 - 199 mg/dL Triglyceride 103 0 - 149 mg/dL HDL Cholesterol 57 >39 mg/dL VLDL, calculated 21 5 - 40 mg/dL LDL, calculated 99 0 - 99 mg/dL METABOLIC PANEL, COMPREHENSIVE Result Value Ref Range Glucose 94 65 - 99 mg/dL BUN 9 6 - 24 mg/dL Creatinine 0.72 0.57 - 1.00 mg/dL GFR est non- >59 mL/min/1.73 GFR est  >59 mL/min/1.73  
 BUN/Creatinine ratio 13 9 - 23 Sodium 139 134 - 144 mmol/L Potassium 4.2 3.5 - 5.2 mmol/L Chloride 102 96 - 106 mmol/L  
 CO2 21 20 - 29 mmol/L Calcium 9.4 8.7 - 10.2 mg/dL Protein, total 7.4 6.0 - 8.5 g/dL Albumin 4.5 3.5 - 5.5 g/dL GLOBULIN, TOTAL 2.9 1.5 - 4.5 g/dL A-G Ratio 1.6 1.2 - 2.2 Bilirubin, total 0.2 0.0 - 1.2 mg/dL Alk. phosphatase 50 39 - 117 IU/L  
 AST (SGOT) 21 0 - 40 IU/L  
 ALT (SGPT) 30 0 - 32 IU/L  
TSH RFX ON ABNORMAL TO FREE T4 Result Value Ref Range TSH 1.090 0.450 - 4.500 uIU/mL  
HIV 1/2 AG/AB, 4TH GENERATION,W RFLX CONFIRM Result Value Ref Range HIV SCREEN 4TH GENERATION WRFX Non Reactive Non Reactive HSV 1/2 AB, IGM Result Value Ref Range HSV I/II Ab, IgM <0.91 0.00 - 0.90 Ratio Allergies Allergen Reactions  Banana Other (comments) GERD  
 Macrobid [Nitrofurantoin Monohyd/M-Cryst] Hives Current Outpatient Medications on File Prior to Visit Medication Sig Dispense Refill  cholecalciferol (VITAMIN D3) 1,000 unit cap Take  by mouth daily. No current facility-administered medications on file prior to visit. Patient Active Problem List  
Diagnosis Code  Ganglion of tendon sheath M67.40  Encounter for long-term (current) use of medications Z79.899  
 IUD (intrauterine device) in place Z97.5  BMI 30.0-30.9,adult Z68.30  Iron deficiency E61.1  Pre-diabetes R73.03  
 Vitamin D deficiency E55.9  
 Hx of one miscarriage Z87.59  
 Hx of  Z87.59  
 Enlarged uterus N85.2 Visit Vitals /62 Pulse 73 Temp 97.9 °F (36.6 °C) (Oral) Resp 18 Ht 5' 2\" (1.575 m) Wt 166 lb (75.3 kg) SpO2 100% BMI 30.36 kg/m² General appearance: alert, cooperative, no distress, appears stated age Neurologic: Alert and oriented X 3, normal strength and tone, symmetric. Normal without focal findings. Cranial nerves 2-12 intact. Normal coordination and gait. Mental status: Alert, oriented, thought content appropriate, affect: stable, mood-congruent. Head: Normocephalic, without obvious abnormality, atraumatic Eyes: conjunctivae/corneas clear. PERRL, EOM's intact. Neck: supple, symmetrical, trachea midline, no JVD Lungs: clear to auscultation bilaterally Heart: regular rate and rhythm, S1, S2 normal, no murmur, click, rub or gallop Abdomen: soft, non-tender. Extremities: extremities normal, atraumatic, no cyanosis or edema Assessment/Plans: 
 
Diagnoses and all orders for this visit: 1. Lipoma of torso 
-     REFERRAL TO GENERAL SURGERY 2. Prediabetes Discussed plans, risk/benefits of treatments/observations. Through the use of shared decision making, above plans were agreed upon. Medication compliance advised. Patient verbalized understanding. Follow-up Disposition: 
Return in about 1 year (around 2/27/2020) for annual exam, sooner as needed. Elliot Heard MD 
2/27/2019

## 2019-08-12 ENCOUNTER — OFFICE VISIT (OUTPATIENT)
Dept: SURGERY | Age: 44
End: 2019-08-12

## 2019-08-12 VITALS
TEMPERATURE: 98.6 F | HEIGHT: 62 IN | BODY MASS INDEX: 30.36 KG/M2 | DIASTOLIC BLOOD PRESSURE: 76 MMHG | OXYGEN SATURATION: 94 % | HEART RATE: 74 BPM | RESPIRATION RATE: 16 BRPM | SYSTOLIC BLOOD PRESSURE: 115 MMHG | WEIGHT: 165 LBS

## 2019-08-12 DIAGNOSIS — D17.1 LIPOMA OF TORSO: Primary | ICD-10-CM

## 2019-08-12 NOTE — PROGRESS NOTES
Surgery Consult:  lipoma  Requesting physician:  Dr. Alcide Bosworth    Subjective:   Patient 45 y.o. female presents with a lump on her left lower chest for about 1 year. It's increasing in size and complains of discomfort with palpation. Denies any recent trauma to this area. No history of infection in this area. No skin changes overlying the lump. Patient underwent US on 2/15/19 and it showed mildly prominent subcutaneous fat, possible lipoma.       Past Medical & Surgical History:  Past Medical History:   Diagnosis Date    BMI 30.0-30.9,adult 3/17/2017    IBS (irritable bowel syndrome)     IUD (intrauterine device) in place 3/17/2017    Pre-diabetes 4/19/2017    Stool color black     Vitamin D deficiency 4/19/2017      Past Surgical History:   Procedure Laterality Date    HX ORTHOPAEDIC      Lt wrist cyst removed       Social History:  Social History     Socioeconomic History    Marital status: SINGLE     Spouse name: Not on file    Number of children: Not on file    Years of education: Not on file    Highest education level: Not on file   Occupational History    Not on file   Social Needs    Financial resource strain: Not on file    Food insecurity:     Worry: Not on file     Inability: Not on file    Transportation needs:     Medical: Not on file     Non-medical: Not on file   Tobacco Use    Smoking status: Never Smoker    Smokeless tobacco: Never Used   Substance and Sexual Activity    Alcohol use: No    Drug use: No    Sexual activity: Yes     Partners: Male     Birth control/protection: IUD   Lifestyle    Physical activity:     Days per week: Not on file     Minutes per session: Not on file    Stress: Not on file   Relationships    Social connections:     Talks on phone: Not on file     Gets together: Not on file     Attends Sikh service: Not on file     Active member of club or organization: Not on file     Attends meetings of clubs or organizations: Not on file     Relationship status: Not on file    Intimate partner violence:     Fear of current or ex partner: Not on file     Emotionally abused: Not on file     Physically abused: Not on file     Forced sexual activity: Not on file   Other Topics Concern    Not on file   Social History Narrative    Not on file        Family History:  Family History   Problem Relation Age of Onset    Asthma Mother     Hypertension Father     Diabetes Father     Elevated Lipids Father     Cancer Sister         Medications:  Current Outpatient Medications   Medication Sig    cholecalciferol (VITAMIN D3) 1,000 unit cap Take  by mouth daily. No current facility-administered medications for this visit. Allergies: Allergies   Allergen Reactions    Banana Other (comments)     GERD    Macrobid [Nitrofurantoin Monohyd/M-Cryst] Hives       Review of Systems  A comprehensive review of systems was negative except for that written in the HPI. Objective:     Exam:    Visit Vitals  /76 (BP 1 Location: Right arm, BP Patient Position: Sitting)   Pulse 74   Temp 98.6 °F (37 °C) (Oral)   Resp 16   Ht 5' 2\" (1.575 m)   Wt 74.8 kg (165 lb)   SpO2 94%   BMI 30.18 kg/m²     General appearance: alert, cooperative, no distress, appears stated age  Lungs: clear to auscultation bilaterally  Heart: regular rate and rhythm  Abdomen: soft, non-tender. Non-distended. Extremities: extremities normal, atraumatic, no cyanosis or edema. Skin: Skin color, texture, turgor normal.  Left lower chest below the breast over the lower edge of the ribs with fullness, but no discrete mass. Non-tender. No skin changes. Neurologic: Grossly normal    Assessment:     Left lower chest lipoma    Plan:     No discrete mass on exam.  Recommend observation for now since symptoms are minimal.  Patient agreeable. Instructed to RTC if symptoms worsens or gets bigger.

## 2019-08-12 NOTE — PROGRESS NOTES
Chief Complaint   Patient presents with    Skin Problem     Patient seen at the request of Dr. Juan Ramon Saenz to evaluate lipoma in Left rib area. 1. Have you been to the ER, urgent care clinic since your last visit? Hospitalized since your last visit? No    2. Have you seen or consulted any other health care providers outside of the [address] [place] [address] since your last visit? Include any pap smears or colon screening.  No

## 2019-10-01 ENCOUNTER — TELEPHONE (OUTPATIENT)
Dept: FAMILY MEDICINE CLINIC | Age: 44
End: 2019-10-01

## 2019-10-01 NOTE — TELEPHONE ENCOUNTER
----- Message from [employer] sent at 10/1/2019  2:00 PM EDT -----  Regarding: Dr. Mccray [employer] Message/Vendor Calls    Caller's first and last name:      Reason for call:  Letter     Callback required yes/no and why:  Yes, to discuss letter     Best contact number(s):  [phone]    Details to clarify the request:  Pt is requesting a letter stating that due to IBS and other medical issues she cannot go to the Gym anymore.      [employer]

## 2019-10-02 NOTE — TELEPHONE ENCOUNTER
MD Ryan Allen LPN   Caller: Unspecified Kaylah Delong,  2:43 PM)             Refuse. She knew she have IBS for many years. Edie Hanson chose to join the gym and now wants to use me as an excuse to get out of contract. As far as I know IBS don't prevent you from working out either. Thanks.      Rosalina Wolfe MD   10/2/2019      Patient notified

## 2019-10-02 NOTE — TELEPHONE ENCOUNTER
PSR called pt per Zo Shepherd to set an appt    Pt states she doesn't need an appt that the info is in her records and she only wants a letter written

## 2020-02-17 ENCOUNTER — NURSE TRIAGE (OUTPATIENT)
Dept: OTHER | Facility: CLINIC | Age: 45
End: 2020-02-17

## 2020-02-17 NOTE — TELEPHONE ENCOUNTER
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 Patient reports laceration is <1/4 inch, reports bleeding is controlled and wound is not gaping. Patient denies pain, reports she cleansed wound and applied neosporin and a bandage. Patient denies numbness or weakness to thumb, denies weakened immune system. Patient reports nail was clean and reports her last tetanus shot was 6 years ago. Writer instructed patient on home care and instructed patient to call back with any new or worsening symptoms. Patient agreeable to plan of care. Writer sent patient employee injury packet to provided e-mail: [email or web address]    Please do not respond to the triage nurse through this encounter. Any subsequent communication should be directly with the patient.

## 2020-03-06 ENCOUNTER — OFFICE VISIT (OUTPATIENT)
Dept: PRIMARY CARE CLINIC | Age: 45
End: 2020-03-06

## 2020-03-06 VITALS
RESPIRATION RATE: 16 BRPM | HEIGHT: 62 IN | HEART RATE: 79 BPM | DIASTOLIC BLOOD PRESSURE: 73 MMHG | BODY MASS INDEX: 31.21 KG/M2 | SYSTOLIC BLOOD PRESSURE: 128 MMHG | WEIGHT: 169.6 LBS | OXYGEN SATURATION: 100 % | TEMPERATURE: 98.8 F

## 2020-03-06 DIAGNOSIS — J01.00 ACUTE MAXILLARY SINUSITIS, RECURRENCE NOT SPECIFIED: Primary | ICD-10-CM

## 2020-03-06 RX ORDER — FLUTICASONE PROPIONATE 50 MCG
2 SPRAY, SUSPENSION (ML) NASAL DAILY
Qty: 1 BOTTLE | Refills: 0 | Status: SHIPPED | OUTPATIENT
Start: 2020-03-06 | End: 2021-02-02 | Stop reason: ALTCHOICE

## 2020-03-06 RX ORDER — BENZONATATE 200 MG/1
200 CAPSULE ORAL
Qty: 21 CAP | Refills: 0 | Status: SHIPPED | OUTPATIENT
Start: 2020-03-06 | End: 2020-03-13

## 2020-03-06 RX ORDER — BENZONATATE 200 MG/1
200 CAPSULE ORAL
Qty: 21 CAP | Refills: 0 | Status: SHIPPED | OUTPATIENT
Start: 2020-03-06 | End: 2020-03-06 | Stop reason: SDUPTHER

## 2020-03-06 RX ORDER — AMOXICILLIN 875 MG/1
875 TABLET, FILM COATED ORAL 2 TIMES DAILY
Qty: 14 TAB | Refills: 0 | Status: SHIPPED | OUTPATIENT
Start: 2020-03-06 | End: 2020-03-13

## 2020-03-06 RX ORDER — AMOXICILLIN 875 MG/1
875 TABLET, FILM COATED ORAL 2 TIMES DAILY
Qty: 14 TAB | Refills: 0 | Status: SHIPPED | OUTPATIENT
Start: 2020-03-06 | End: 2020-03-06 | Stop reason: SDUPTHER

## 2020-03-06 RX ORDER — FLUTICASONE PROPIONATE 50 MCG
2 SPRAY, SUSPENSION (ML) NASAL DAILY
Qty: 1 BOTTLE | Refills: 0 | Status: SHIPPED | OUTPATIENT
Start: 2020-03-06 | End: 2020-03-06 | Stop reason: SDUPTHER

## 2020-03-10 ENCOUNTER — OFFICE VISIT (OUTPATIENT)
Dept: PRIMARY CARE CLINIC | Age: 45
End: 2020-03-10

## 2020-03-10 VITALS
WEIGHT: 169.4 LBS | HEIGHT: 62 IN | RESPIRATION RATE: 22 BRPM | HEART RATE: 66 BPM | TEMPERATURE: 98.6 F | BODY MASS INDEX: 31.17 KG/M2 | OXYGEN SATURATION: 98 % | SYSTOLIC BLOOD PRESSURE: 125 MMHG | DIASTOLIC BLOOD PRESSURE: 81 MMHG
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
## 2020-03-10 DIAGNOSIS — M79.10 MUSCLE SORENESS: Primary | ICD-10-CM

## 2020-03-10 NOTE — PATIENT INSTRUCTIONS
Bruises: Care Instructions Your Care Instructions Bruises occur when small blood vessels under the skin tear or rupture, most often from a twist, bump, or fall. Blood leaks into tissues under the skin and causes a black-and-blue spot that often turns colors, including purplish black, reddish blue, or yellowish green, as the bruise heals. Bruises hurt, but most are not serious and will go away on their own within 2 to 4 weeks. Sometimes, gravity causes them to spread down the body. A leg bruise usually will take longer to heal than a bruise on the face or arms. Follow-up care is a key part of your treatment and safety. Be sure to make and go to all appointments, and call your doctor if you are having problems. It's also a good idea to know your test results and keep a list of the medicines you take. How can you care for yourself at home? · Take pain medicines exactly as directed. ? If the doctor gave you a prescription medicine for pain, take it as prescribed. ? If you are not taking a prescription pain medicine, ask your doctor if you can take an over-the-counter medicine. · Put ice or a cold pack on the area for 10 to 20 minutes at a time. Put a thin cloth between the ice and your skin. · If you can, prop up the bruised area on pillows as much as possible for the next few days. Try to keep the bruise above the level of your heart. When should you call for help? Call your doctor now or seek immediate medical care if: 
  · You have signs of infection, such as: 
? Increased pain, swelling, warmth, or redness. ? Red streaks leading from the bruise. ? Pus draining from the bruise. ? A fever.  
  · You have a bruise on your leg and signs of a blood clot, such as: 
? Increasing redness and swelling along with warmth, tenderness, and pain in the bruised area. ? Pain in your calf, back of the knee, thigh, or groin. ? Redness and swelling in your leg or groin.  
  · Your pain gets worse.  Watch closely for changes in your health, and be sure to contact your doctor if: 
  · You do not get better as expected. Where can you learn more? Go to http://judson-giovanni.info/. Enter ([phone] [phone] in the search box to learn more about \"Bruises: Care Instructions. \" Current as of: June 26, 2019 Content Version: 12.2 © 6942-4363 [employer]. Care instructions adapted under license by [employer] (which disclaims liability or warranty for this information). If you have questions about a medical condition or this instruction, always ask your healthcare professional. [address] any warranty or liability for your use of this information.

## 2020-03-10 NOTE — PROGRESS NOTES
HISTORY OF PRESENT ILLNESS  Moon Madden is a 45 y.o. female. HPI  Chief Complaint   Patient presents with    Shoulder Injury     Pt States \" i fell doen the stairs all whole left sides hurts and the type of work that i do i just dont want to do more damage, fell on sunday, one of the nurses gave me naproxen and that was it\". Pt presents to clinic after fall 2 days ago down stairs at her friends house. C/o left neck, shoulder, side and hip discomfort. 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 No meds for pain. Past Medical History:   Diagnosis Date    BMI 30.0-30.9,adult 3/17/2017    IBS (irritable bowel syndrome)     IUD (intrauterine device) in place 3/17/2017    Pre-diabetes 4/19/2017    Stool color black     Vitamin D deficiency 4/19/2017     Past Surgical History:   Procedure Laterality Date    HX ORTHOPAEDIC      Lt wrist cyst removed     Allergies   Allergen Reactions    Banana Other (comments)     GERD    Macrobid [Nitrofurantoin Monohyd/M-Cryst] Hives       Review of Systems   Constitutional: Negative for chills and fever. Respiratory: Negative for cough, shortness of breath and wheezing. Cardiovascular: Negative for chest pain. Gastrointestinal: Negative for nausea and vomiting. Musculoskeletal: Positive for falls, joint pain (L shoulder/hip) and neck pain. Negative for back pain. Neurological: Negative for dizziness, tingling, loss of consciousness, weakness and headaches. Physical Exam  Constitutional:       General: She is not in acute distress. Appearance: Normal appearance. She is not ill-appearing. HENT:      Head: Normocephalic and atraumatic. Neck:      Musculoskeletal: Normal range of motion. Muscular tenderness present. No neck rigidity. Pulmonary:      Effort: Pulmonary effort is normal. No tachypnea, accessory muscle usage, respiratory distress or retractions. Chest:      Chest wall: Tenderness present.  No lacerations, deformity, crepitus or edema. Musculoskeletal:      Left shoulder: She exhibits tenderness and pain. She exhibits normal range of motion, no swelling, no effusion, no crepitus, no deformity, no laceration and normal strength. Left hip: She exhibits tenderness. She exhibits normal range of motion, normal strength, no bony tenderness, no swelling, no crepitus, no deformity and no laceration. Left upper arm: Normal. She exhibits no tenderness, no bony tenderness, no swelling, no deformity and no laceration. Skin:     Comments: No ecchymosis or obvious signs of trauma/injury   Neurological:      Mental Status: She is alert. ASSESSMENT and PLAN    ICD-10-CM ICD-9-CM    1. Muscle soreness M79.10 729.1    2. Fall, initial encounter [address] XXXA W522.2      Recommend ice/heat, otc pain relievers, rest.  RTC prn. Shai Hinson NP  This note will not be viewable in [address]

## 2020-03-10 NOTE — PROGRESS NOTES
Rosina Mo is a 45 y.o. female    Room:6    Chief Complaint   Patient presents with    Shoulder Injury     Pt States \" i fell doen the stairs all whole left sides hurts and the type of work that i do i just dont want to do more damage, fell on sunday, one of the nurses gave me naproxen and that was it\". Visit Vitals  /81 (BP 1 Location: Right arm, BP Patient Position: Sitting)   Pulse 66   Temp 98.6 °F (37 °C) (Oral)   Resp 22   Ht 5' 2\" (1.575 m)   Wt 169 lb 6.4 oz (76.8 kg)   SpO2 98%   BMI 30.98 kg/m²       Pain Scale: /10    1. Have you been to the ER, urgent care clinic since your last visit? Hospitalized since your last visit? No    2. Have you seen or consulted any other health care providers outside of the [address] [place] [address] since your last visit? Include any pap smears or colon screening.  No

## 2020-03-23 ENCOUNTER — NURSE TRIAGE (OUTPATIENT)
Dept: OTHER | Facility: CLINIC | Age: 45
End: 2020-03-23

## 2020-03-23 NOTE — TELEPHONE ENCOUNTER
Reason for Disposition   Cough with no complications    Protocols used: COUGH-ADULT-[place]    Pt calling for employee benefit. Pt states she has had a cough \"for awhile. \" States 2 weeks ago she was seen at her PCP for sinus infection. Given abx and cough meds. She continues to have cough. No fever. No sob. No chest pain. No wheezing. Triage indicates for pt to be cared for at home. Pt instructed to call back for any new or worsening symptoms. Pt not given any work restrictions. Pt verbalized understanding. She denies any other questions or concerns. Please do not respond to the triage nurse through this encounter. Any subsequent communication should be directly with the patient.

## 2020-04-15 ENCOUNTER — VIRTUAL VISIT (OUTPATIENT)
Dept: FAMILY MEDICINE CLINIC | Age: 45
End: 2020-04-15

## 2020-04-15 VITALS — HEART RATE: 91 BPM | DIASTOLIC BLOOD PRESSURE: 85 MMHG | SYSTOLIC BLOOD PRESSURE: 126 MMHG

## 2020-04-15 DIAGNOSIS — D17.79 LIPOMA OF OTHER SPECIFIED SITES: ICD-10-CM

## 2020-04-15 DIAGNOSIS — M25.512 ACUTE SHOULDER PAIN DUE TO TRAUMA, LEFT: ICD-10-CM
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
## 2020-04-15 DIAGNOSIS — G89.11 ACUTE SHOULDER PAIN DUE TO TRAUMA, LEFT: ICD-10-CM

## 2020-04-15 RX ORDER — BISMUTH SUBSALICYLATE 262 MG
1 TABLET,CHEWABLE ORAL DAILY
COMMUNITY
End: 2020-05-14 | Stop reason: ALTCHOICE

## 2020-04-15 NOTE — PROGRESS NOTES
Consent: Rigo Sorto, who was seen by synchronous (real-time) audio-video technology, and/or her healthcare decision maker, is aware that this patient-initiated, Telehealth encounter on 4/15/2020 is a billable service, with coverage as determined by her insurance carrier. She is aware that she may receive a bill and has provided verbal consent to proceed: Yes. I last saw this pt 2/2019     She fell 3/08/2020 fell down the stairs and went to urgent care. Had leg pain and shoulder pain. Leg pain resolved. But shoulder pain still present. Shoulder pain left still have good ROM but painful with it. Still able to lift but if heavy will cause pain. No swelling. We discussed options and partial tears possibility and time, PT. Refer to PT, to orthopedic. She understands it may takes month due to pandemic lock down. Lipoma pain at site. Left upper abdomen. I saw this in 2/27/2020  She saw general surg 09/2019  Denies N/V, diarrhea or constipation. PO fine  Refer to Gen Surgery      Assessment & Plan:   Diagnoses and all orders for this visit:    1. Fall (on) (from) other stairs and steps, subsequent encounter  -     REFERRAL TO PHYSICAL THERAPY  -     REFERRAL TO ORTHOPEDICS    2. Acute shoulder pain due to trauma, left  -     REFERRAL TO PHYSICAL THERAPY  -     REFERRAL TO ORTHOPEDICS    3. Lipoma of other specified sites  -     REFERRAL TO GENERAL SURGERY        Follow-up and Dispositions    · Return in about 6 weeks (around 5/27/2020) for annual exam.         I spent at least 15 minutes with this established patient, and >50% of the time was spent counseling and/or coordinating care regarding concerns documented above  712  Subjective:   Rigo Sorto is a 39 y.o. female who was seen for Arm Pain (left) and Abdominal Pain (LLQ)      Prior to Admission medications    Medication Sig Start Date End Date Taking?  Authorizing Provider   multivitamin (ONE A DAY) tablet Take 1 Tab by mouth daily. Yes Provider, Historical   fluticasone propionate (FLONASE) 50 mcg/actuation nasal spray 2 Sprays by Both Nostrils route daily. 3/6/20  Yes Karishma Ragland MD   NAPROXEN PO Take  by mouth. Provider, Historical   cholecalciferol (VITAMIN D3) 1,000 unit cap Take  by mouth daily. Provider, Historical     Allergies   Allergen Reactions    Banana Other (comments)     GERD    Macrobid [Nitrofurantoin Monohyd/M-Cryst] Hives       Allergies   Allergen Reactions    Banana Other (comments)     GERD    Macrobid [Nitrofurantoin Monohyd/M-Cryst] Hives     Past Medical History:   Diagnosis Date    BMI 30.0-30.9,adult 3/17/2017    IBS (irritable bowel syndrome)     IUD (intrauterine device) in place 3/17/2017    Pre-diabetes 4/19/2017    Stool color black     Vitamin D deficiency 4/19/2017     Past Surgical History:   Procedure Laterality Date    HX ORTHOPAEDIC      Lt wrist cyst removed       Objective:   Vital Signs: (As obtained by patient/caregiver at home)  Visit Vitals  /85   Pulse 91   LMP 03/07/2020 (Exact Date)        [INSTRUCTIONS:  \"[x]\" Indicates a positive item  \"[]\" Indicates a negative item  -- DELETE ALL ITEMS NOT EXAMINED]    Constitutional: [x] Appears well-developed and well-nourished [x] No apparent distress      [] Abnormal -     Mental status: [x] Alert and awake  [x] Oriented to person/place/time [x] Able to follow commands    [] Abnormal -     Eyes:   EOM    [x]  Normal    [] Abnormal -   Sclera  [x]  Normal    [] Abnormal -          Discharge [x]  None visible   [] Abnormal -     HENT: [x] Normocephalic, atraumatic  [] Abnormal -   [] Mouth/Throat: Mucous membranes are moist    External Ears [x] Normal  [] Abnormal -    Neck: [x] No visualized mass [] Abnormal -   SHOULDER NROM, pt c/o pain at extreme of motion.       Pulmonary/Chest: [x] Respiratory effort normal   [x] No visualized signs of difficulty breathing or respiratory distress        [] Abnormal - Musculoskeletal:   [x] Normal gait with no signs of ataxia         [x] Normal range of motion of neck        [] Abnormal -     Neurological:        [x] No Facial Asymmetry (Cranial nerve 7 motor function) (limited exam due to video visit)          [x] No gaze palsy        [] Abnormal -          Skin:        [] No significant exanthematous lesions or discoloration noted on facial skin         [] Abnormal -            Psychiatric:       [x] Normal Affect [] Abnormal -        [x] No Hallucinations      We discussed the expected course, resolution and complications of the diagnosis(es) in detail. Medication risks, benefits, costs, interactions, and alternatives were discussed as indicated. I advised her to contact the office if her condition worsens, changes or fails to improve as anticipated. She expressed understanding with the diagnosis(es) and plan. Doc Bernabe is a 39 y.o. female being evaluated by a video visit encounter for concerns as above. A caregiver was present when appropriate. Due to this being a TeleHealth encounter (During [facility]-28 public health emergency), evaluation of the following organ systems was limited: Vitals/Constitutional/EENT/Resp/CV/GI//MS/Neuro/Skin/Heme-Lymph-Imm. Pursuant to the emergency declaration under the [facility], 1135 waiver authority and the [employer] Act, this Virtual  Visit was conducted, with patient's (and/or legal guardian's) consent, to reduce the patient's risk of exposure to COVID-19 and provide necessary medical care. Services were provided through a video synchronous discussion virtually to substitute for in-person clinic visit. Patient and provider were located at their individual homes.         Deonna Escobar MD

## 2020-04-15 NOTE — PROGRESS NOTES
Chief Complaint   Patient presents with    Arm Pain     left    Abdominal Pain     LLLUIS A Alvarez in March seen at [facility]. 1. Have you been to the ER, urgent care clinic since your last visit? Hospitalized since your last visit? no    2. Have you seen or consulted any other health care providers outside of the [address] [place] [address] since your last visit? Include any pap smears or colon screening.  no

## 2020-04-17 ENCOUNTER — NURSE TRIAGE (OUTPATIENT)
Dept: OTHER | Facility: CLINIC | Age: 45
End: 2020-04-17

## 2020-04-17 NOTE — TELEPHONE ENCOUNTER
Reason for Disposition   [1] COVID-19 EXPOSURE within last 14 days AND [2] NO cough, fever, or breathing difficulty AND [3] exposed person is a healthcare worker who was NOT using all recommended personal protective equipment (i.e., a respirator-N95 mask, eye protection, gloves, and gown)    Answer Assessment - Initial Assessment Questions  1. CLOSE CONTACT: \"Who is the person with the confirmed or suspected COVID-19 infection that you were exposed to? 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 PLACE of CONTACT: \"Where were you when you were exposed to COVID-19? \" (e.g., home, school, medical waiting room; which city?)      [address]  3. TYPE of CONTACT: \"How much contact was there? \" (e.g., sitting next to, live in same house, work in same office, same building)      Direct  Contact works with patient   4. DURATION of CONTACT: \"How long were you in contact with the COVID-19 patient? \" (e.g., a few seconds, passed by person, a few minutes, live with the patient)      Enough to change brief and help with ADLs  5. DATE of CONTACT: \"When did you have contact with a COVID-19 patient? \" (e.g., how many days ago)      Last Friday   6. TRAVEL: \"Have you traveled out of the country recently? \" If so, \"When and where? \"      * Also ask about out-of-state travel, since the [facility] has identified some high risk cities for community spread in the [address] [place] [address],[address]. * Note: Travel becomes less relevant if there is widespread community transmission where the patient lives. No travel   7. COMMUNITY SPREAD: \"Are there lots of cases or COVID-19 (community spread) where you live? \" (See public health department website, if unsure)    * MAJOR community spread: high number of cases; numbers of cases are increasing; many people hospitalized. * MINOR community spread: low number of cases; not increasing; few or no people hospitalized      Unsure  8. SYMPTOMS: \"Do you have any symptoms? \" (e.g., fever, cough, breathing difficulty)      Temp is 99.4,No cough, no SOB, headache present   9. PREGNANCY OR POSTPARTUM: \"Is there any chance you are pregnant? \" \"When was your last menstrual period? \" \"Did you deliver in the last 2 weeks? \"      no  10. HIGH RISK: \"Do you have any heart or lung problems? Do you have a weak immune system? \" (e.g., CHF, COPD, asthma, HIV positive, chemotherapy, renal failure, diabetes mellitus, sickle cell anemia)        No heart or lung problems    Protocols used: CORONAVIRUS (COVID-19) EXPOSURE-ADULT-AH

## 2020-04-18 ENCOUNTER — OFFICE VISIT (OUTPATIENT)
Dept: URGENT CARE | Age: 45
End: 2020-04-18

## 2020-04-18 VITALS — HEART RATE: 102 BPM | RESPIRATION RATE: 18 BRPM | OXYGEN SATURATION: 99 % | TEMPERATURE: 99.5 F

## 2020-04-18 DIAGNOSIS — R68.89 FLU-LIKE SYMPTOMS: Primary | ICD-10-CM

## 2020-04-18 RX ORDER — BENZONATATE 200 MG/1
200 CAPSULE ORAL
Qty: 21 CAP | Refills: 0 | Status: SHIPPED | OUTPATIENT
Start: 2020-04-18 | End: 2020-04-25

## 2020-04-18 RX ORDER — PROMETHAZINE HYDROCHLORIDE AND DEXTROMETHORPHAN HYDROBROMIDE 6.25; 15 MG/5ML; MG/5ML
5 SYRUP ORAL
Qty: 60 ML | Refills: 0 | Status: SHIPPED | OUTPATIENT
Start: 2020-04-18 | End: 2021-02-02 | Stop reason: ALTCHOICE

## 2020-04-18 NOTE — PROGRESS NOTES
This patient was seen in Flu Clinic at [address] [place] [address] Urgent Care while in their vehicle due to COVID-19 pandemic with PPE and focused examination in order to decrease community viral transmission. The patient/guardian gave verbal consent to treat. Cold Symptoms   The history is provided by the patient. This is a new problem. The current episode started more than 2 days ago. The problem occurs constantly. The problem has not changed since onset. The cough is non-productive. There has been a fever of 100 - 100.9 F. Associated symptoms include headaches, sore throat and myalgias. Pertinent negatives include no chills, no sweats, no shortness of breath, no wheezing, no nausea and no vomiting. She has tried nothing for the symptoms. 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 Her past medical history does not include bronchitis, pneumonia or asthma. Past medical history comments: seosonal allergies.         Past Medical History:   Diagnosis Date    BMI 30.0-30.9,adult 3/17/2017    IBS (irritable bowel syndrome)     IUD (intrauterine device) in place 3/17/2017    Pre-diabetes 4/19/2017    Stool color black     Vitamin D deficiency 4/19/2017        Past Surgical History:   Procedure Laterality Date    HX ORTHOPAEDIC      Lt wrist cyst removed         Family History   Problem Relation Age of Onset    Asthma Mother     Hypertension Father     Diabetes Father     Elevated Lipids Father     Cancer Sister         Social History     Socioeconomic History    Marital status: SINGLE     Spouse name: Not on file    Number of children: Not on file    Years of education: Not on file    Highest education level: Not on file   Occupational History    Not on file   Social Needs    Financial resource strain: Not on file    Food insecurity     Worry: Not on file     Inability: Not on file    Transportation needs     Medical: Not on file     Non-medical: Not on file   Tobacco Use    Smoking status: Never Smoker    Smokeless tobacco: Never Used   Substance and Sexual Activity    Alcohol use: No    Drug use: No    Sexual activity: Yes     Partners: Male     Birth control/protection: I.U.D. Lifestyle    Physical activity     Days per week: Not on file     Minutes per session: Not on file    Stress: Not on file   Relationships    Social connections     Talks on phone: Not on file     Gets together: Not on file     Attends Nondenominational service: Not on file     Active member of club or organization: Not on file     Attends meetings of clubs or organizations: Not on file     Relationship status: Not on file    Intimate partner violence     Fear of current or ex partner: Not on file     Emotionally abused: Not on file     Physically abused: Not on file     Forced sexual activity: Not on file   Other Topics Concern    Not on file   Social History Narrative    Not on file                ALLERGIES: Banana and Macrobid [nitrofurantoin monohyd/m-cryst]    Review of Systems   Constitutional: Positive for fatigue and fever (low grade). Negative for appetite change and chills. HENT: Positive for sore throat. Decrease sense of smell   Respiratory: Negative for shortness of breath and wheezing. Gastrointestinal: Negative for nausea and vomiting. Musculoskeletal: Positive for myalgias. Neurological: Positive for light-headedness and headaches. All other systems reviewed and are negative. Vitals:    04/18/20 0844   Pulse: (!) 102   Resp: 18   Temp: 99.5 °F (37.5 °C)   SpO2: 99%       Physical Exam  Vitals signs and nursing note reviewed. Constitutional:       General: She is not in acute distress. Appearance: She is not ill-appearing. HENT:      Nose: Nose normal.      Mouth/Throat:      Pharynx: No oropharyngeal exudate or posterior oropharyngeal erythema.    Pulmonary:      Effort: Pulmonary effort is normal. No respiratory distress. Breath sounds: Normal breath sounds. No stridor. No wheezing, rhonchi or rales. Neurological:      Mental Status: She is alert. MDM    Procedures      ICD-10-CM ICD-9-CM    1. Flu-like symptoms R68.89 780.99 NOVEL CORONAVIRUS (COVID-19)       covid test done  Quarantine until result comes back    symptomatic rx- avoid ibuprofen       Medications Ordered Today   Medications    benzonatate (TESSALON) 200 mg capsule     Sig: Take 1 Cap by mouth three (3) times daily as needed for Cough for up to 7 days. Dispense:  21 Cap     Refill:  0    promethazine-dextromethorphan (PROMETHAZINE-DM) 6.25-15 mg/5 mL syrup     Sig: Take 5 mL by mouth nightly as needed for Cough. Dispense:  60 mL     Refill:  0     No results found for any visits on 04/18/20. The patients condition was discussed with the patient and they understand. The patient is to follow up with primary care doctor. If signs and symptoms become worse the pt is to go to the ER. The patient is to take medications as prescribed.

## 2020-04-18 NOTE — PATIENT INSTRUCTIONS
Learning About Coronavirus [phone])  Coronavirus [phone]): Overview  What is coronavirus (COVID-19)? The coronavirus disease (COVID-19) is caused by a virus. It is an illness that was first found in [place], [place], in December 2019. It has since spread worldwide. The virus can cause fever, cough, and trouble breathing. In severe cases, it can cause pneumonia and make it hard to breathe without help. It can cause death. Coronaviruses are a large group of viruses. They cause the common cold. They also cause more serious illnesses like Middle East respiratory syndrome (MERS) and severe acute respiratory syndrome (SARS). COVID-19 is caused by a novel coronavirus. That means it's a new type that has not been seen in people before. This virus spreads person-to-person through droplets from coughing and sneezing. It can also spread when you are close to someone who is infected. And it can spread when you touch something that has the virus on it, such as a doorknob or a tabletop. What can you do to protect yourself from coronavirus (COVID-19)? The best way to protect yourself from getting sick is to:  · Avoid areas where there is an outbreak. · Avoid contact with people who may be infected. · Wash your hands often with soap or alcohol-based hand sanitizers. · Avoid crowds and try to stay at least 6 feet away from other people. · Wash your hands often, especially after you cough or sneeze. Use soap and water, and scrub for at least 20 seconds. If soap and water aren't available, use an alcohol-based hand . · Avoid touching your mouth, nose, and eyes. What can you do to avoid spreading the virus to others? To help avoid spreading the virus to others:  · Cover your mouth with a tissue when you cough or sneeze. Then throw the tissue in the trash. · Use a disinfectant to clean things that you touch often. · Stay home if you are sick or have been exposed to the virus.  Don't go to school, work, or public areas. And don't use public transportation. · If you are sick:  ? Leave your home only if you need to get medical care. But call the doctor's office first so they know you're coming. And wear a face mask, if you have one.  ? If you have a face mask, wear it whenever you're around other people. It can help stop the spread of the virus when you cough or sneeze. ? Clean and disinfect your home every day. Use household  and disinfectant wipes or sprays. Take special care to clean things that you grab with your hands. These include doorknobs, remote controls, phones, and handles on your refrigerator and microwave. And don't forget countertops, tabletops, bathrooms, and computer keyboards. When to call for help  Call 911 anytime you think you may need emergency care. For example, call if:  · You have severe trouble breathing. (You can't talk at all.)  · You have constant chest pain or pressure. · You are severely dizzy or lightheaded. · You are confused or can't think clearly. · Your face and lips have a blue color. · You pass out (lose consciousness) or are very hard to wake up. Call your doctor now if you develop symptoms such as:  · Shortness of breath. · Fever. · Cough. If you need to get care, call ahead to the doctor's office for instructions before you go. Make sure you wear a face mask, if you have one, to prevent exposing other people to the virus. Where can you get the latest information? The following health organizations are tracking and studying this virus. Their websites contain the most up-to-date information. Augustin Chandler also learn what to do if you think you may have been exposed to the virus. · U.S. Centers for Disease Control and Prevention (CDC): The CDC provides updated news about the disease and travel advice. The website also tells you how to prevent the spread of infection. www.cdc.gov  · World Health Organization [facility]): WHO offers information about the virus outbreaks.  WHO also has travel advice. www.who.int  Current as of: April 1, 2020               Content Version: 12.4  © 2832-4588 [employer]. Care instructions adapted under license by your healthcare professional. If you have questions about a medical condition or this instruction, always ask your healthcare professional. [address] any warranty or liability for your use of this information.

## 2020-04-20 ENCOUNTER — VIRTUAL VISIT (OUTPATIENT)
Dept: FAMILY MEDICINE CLINIC | Age: 45
End: 2020-04-20

## 2020-04-20 VITALS
WEIGHT: 165 LBS | DIASTOLIC BLOOD PRESSURE: 89 MMHG | HEIGHT: 62 IN | BODY MASS INDEX: 30.36 KG/M2 | SYSTOLIC BLOOD PRESSURE: 145 MMHG | TEMPERATURE: 99 F | HEART RATE: 97 BPM

## 2020-04-20 DIAGNOSIS — U07.1 COVID-19: Primary | ICD-10-CM

## 2020-04-20 LAB — SARS-COV-2, NAA: DETECTED

## 2020-04-20 NOTE — PROGRESS NOTES
Patient was notified of [address]. Went over symptoms with patient. Advised to go to ER if having any SOB or elevated fevers.

## 2020-04-20 NOTE — PROGRESS NOTES
Consent: Sam Smith, who was seen by synchronous (real-time) audio-video technology, and/or her healthcare decision maker, is aware that this patient-initiated, Telehealth encounter on 4/20/2020 is a billable service, with coverage as determined by her insurance carrier. She is aware that she may receive a bill and has provided verbal consent to proceed: Yes.     has a past medical history of BMI 30.0-30.9,adult (3/17/2017), IBS (irritable bowel syndrome), IUD (intrauterine device) in place (3/17/2017), Pre-diabetes (4/19/2017), Stool color black, and Vitamin D deficiency (4/19/2017). [facility]18/2020 [facility]. She is a CNA nurse. Symptomatic 5 days of cough, fever, headache, sore throat, myalgias, lost sense of smell. Denies SOB, wheezing. Taking tylenol PRN. She have temp of 99, had tylenol 5 hrs. Ago. She is isolating her self. We discussed what we know currently  She needs 2 negative tests before she can go back to work. If worsening SOB to go to ED      Reviewed: active problem list, medication list, allergies, notes from last encounter, lab results    A comprehensive review of systems was negative except for that written in the HPI. Assessment & Plan:   Diagnoses and all orders for this visit:    1. COVID-19      Follow-up and Dispositions    · Return in about 5 days (around 4/25/2020), or if symptoms worsen or fail to improve. I spent at least 15 minutes with this established patient, and >50% of the time was spent counseling and/or coordinating care regarding COVID 19 positive  712  Subjective:   Sam Smith is a 39 y.o. female who was seen for Concern For COVID-19 (Coronavirus) (test positive)      Prior to Admission medications    Medication Sig Start Date End Date Taking? Authorizing Provider   multivitamin (ONE A DAY) tablet Take 1 Tab by mouth daily.    Yes Provider, Historical   fluticasone propionate (FLONASE) 50 mcg/actuation nasal spray 2 Sprays by Both Nostrils route daily. 3/6/20  Yes Nicky James MD   benzonatate (TESSALON) 200 mg capsule Take 1 Cap by mouth three (3) times daily as needed for Cough for up to 7 days. 4/18/20 4/25/20  Aliyah Cornejo MD   promethazine-dextromethorphan (PROMETHAZINE-DM) 6.25-15 mg/5 mL syrup Take 5 mL by mouth nightly as needed for Cough.  4/18/20   Aliyah Cornejo MD     Allergies   Allergen Reactions    Banana Other (comments)     GERD    Macrobid [Nitrofurantoin Monohyd/M-Cryst] Hives       Past Medical History:   Diagnosis Date    BMI 30.0-30.9,adult 3/17/2017    IBS (irritable bowel syndrome)     IUD (intrauterine device) in place 3/17/2017    Pre-diabetes 4/19/2017    Stool color black     Vitamin D deficiency 4/19/2017     Past Surgical History:   Procedure Laterality Date    HX ORTHOPAEDIC      Lt wrist cyst removed         Objective:   Vital Signs: (As obtained by patient/caregiver at home)  Visit Vitals  /89   Pulse 97   Temp 99 °F (37.2 °C) (Oral)   Ht 5' 2\" (1.575 m)   Wt 165 lb (74.8 kg)   LMP 03/07/2020 (Exact Date)   BMI 30.18 kg/m²        Constitutional: [x] Appears well-developed and well-nourished [x] No apparent distress      [] Abnormal -     Mental status: [x] Alert and awake  [x] Oriented to person/place/time [x] Able to follow commands    [] Abnormal -     Eyes:   EOM    [x]  Normal    [] Abnormal -   Sclera  [x]  Normal    [] Abnormal -          Discharge [x]  None visible   [] Abnormal -     HENT: [x] Normocephalic, atraumatic  [] Abnormal -   [] Mouth/Throat: Mucous membranes are moist    External Ears [x] Normal  [] Abnormal -    Neck: [x] No visualized mass [] Abnormal -     Pulmonary/Chest: [x] Respiratory effort normal   [x] No visualized signs of difficulty breathing or respiratory distress        [] Abnormal -      Musculoskeletal:   [x] Normal gait with no signs of ataxia         [x] Normal range of motion of neck        [] Abnormal -     Neurological: [x] No Facial Asymmetry (Cranial nerve 7 motor function) (limited exam due to video visit)          [x] No gaze palsy        [] Abnormal -          Skin:        [x] No significant exanthematous lesions or discoloration noted on facial skin         [] Abnormal -            Psychiatric:       [x] Normal Affect [] Abnormal -        [x] No Hallucinations      We discussed the expected course, resolution and complications of the diagnosis(es) in detail. Medication risks, benefits, costs, interactions, and alternatives were discussed as indicated. I advised her to contact the office if her condition worsens, changes or fails to improve as anticipated. She expressed understanding with the diagnosis(es) and plan. Sasha Iraheta is a 39 y.o. female being evaluated by a video visit encounter for concerns as above. A caregiver was present when appropriate. Due to this being a TeleHealth encounter (During XUSHI-44 public health emergency), evaluation of the following organ systems was limited: Vitals/Constitutional/EENT/Resp/CV/GI//MS/Neuro/Skin/Heme-Lymph-Imm. Pursuant to the emergency declaration under the [facility], 1135 waiver authority and the [employer] General Act, this Virtual  Visit was conducted, with patient's (and/or legal guardian's) consent, to reduce the patient's risk of exposure to COVID-19 and provide necessary medical care. Services were provided through a video synchronous discussion virtually to substitute for in-person clinic visit. Patient and provider were located at their individual homes.         Gian Kothari MD

## 2020-04-20 NOTE — PROGRESS NOTES
Chief Complaint   Patient presents with    Concern For COVID-19 (Coronavirus)     test positive         1. Have you been to the ER, urgent care clinic since your last visit? Hospitalized since your last visit? yes    2. Have you seen or consulted any other health care providers outside of the [address] [place] [address] since your last visit? Include any pap smears or colon screening.  no

## 2020-04-23 ENCOUNTER — NURSE TRIAGE (OUTPATIENT)
Dept: OTHER | Facility: CLINIC | Age: 45
End: 2020-04-23

## 2020-04-23 ENCOUNTER — TELEPHONE (OUTPATIENT)
Dept: FAMILY MEDICINE CLINIC | Age: 45
End: 2020-04-23

## 2020-04-23 NOTE — TELEPHONE ENCOUNTER
Pt reports she got positive COVID results on 4/20/2020    Concerned about fevers - now it is 99.4    She stopped taking tylenol b/c of taking it for last 7 days   She thinks he skin is red     Thinks she is getting rash on her elbow       Best number to reach her is [phone]

## 2020-04-23 NOTE — TELEPHONE ENCOUNTER
Reason for Disposition   Fever present > 3 days (72 hours)    Answer Assessment - Initial Assessment Questions  1. TEMPERATURE: \"What is the most recent temperature? \"  \"How was it measured? \"       Oral tmax 100.2 today  2. ONSET: \"When did the fever start? \"       Last Wednesday   3. SYMPTOMS: \"Do you have any other symptoms besides the fever? \"  (e.g., colds, headache, sore throat, earache, cough, rash, diarrhea, vomiting, abdominal pain)      Cough, tested + covid - resulted this monday, sinus drainage, bumps on feet x 2 weeks, reddish dry patch on right elbow, reddish marks on legs  4. CAUSE: If there are no symptoms, ask: \"What do you think is causing the fever? \"       She is + covid  5. CONTACTS: \"Does anyone else in the family have an infection? \"      *No Answer*  6. TREATMENT: \"What have you done so far to treat this fever? \" (e.g., medications)      Tylenol cold and severe flu  7. IMMUNOCOMPROMISE: \"Do you have of the following: diabetes, HIV positive, splenectomy, cancer chemotherapy, chronic steroid treatment, transplant patient, etc.\"      no  8. PREGNANCY: \"Is there any chance you are pregnant? \" \"When was your last menstrual period? \"      No cycle since march 2 .  Pregnancy test was neg. 9. TRAVEL: \"Have you traveled out of the country in the last month? \" (e.g., travel history, exposures)      no    Protocols used: FEVER-ADULT-AH    Pt with symptoms as documented above. Pt informed of disposition. Pt educated on [employer], web site and/or [employer]. Care advice as documented.

## 2020-04-24 NOTE — TELEPHONE ENCOUNTER
MD José Miguel Haines LPN   Caller: Unspecified Tiffani Treadwell,  4:45 PM)             So she did see us recently. But it's still hard to tell what the rash is due to.  If it goes away and she's not worried then fine. If the rash is getting worse then call us.      But fever may not be due to this rash     Randee Mcghee MD   4/24/2020      Patient notified

## 2020-04-24 NOTE — TELEPHONE ENCOUNTER
Spoke with patient. She is running low grade fever 100.4 to 99.4 yesterday. She noticed that her skin is reddish in color with rash in elbow area. She is taking Tylenol when temp above 100. She wants to know if she should do anything else d/t skin & rash.

## 2020-04-28 ENCOUNTER — HOSPITAL ENCOUNTER (EMERGENCY)
Age: 45
Discharge: HOME | End: 2020-04-28
Attending: STUDENT IN AN ORGANIZED HEALTH CARE EDUCATION/TRAINING PROGRAM | Admitting: STUDENT IN AN ORGANIZED HEALTH CARE EDUCATION/TRAINING PROGRAM
Payer: COMMERCIAL

## 2020-04-28 VITALS
BODY MASS INDEX: 29.26 KG/M2 | TEMPERATURE: 98.7 F | RESPIRATION RATE: 16 BRPM | SYSTOLIC BLOOD PRESSURE: 117 MMHG | OXYGEN SATURATION: 97 % | WEIGHT: 160 LBS | DIASTOLIC BLOOD PRESSURE: 57 MMHG | HEART RATE: 90 BPM

## 2020-04-28 DIAGNOSIS — R68.83 CHILLS: ICD-10-CM

## 2020-04-28 DIAGNOSIS — T78.40XA ALLERGIC REACTION, INITIAL ENCOUNTER: ICD-10-CM

## 2020-04-28 DIAGNOSIS — U07.1 COVID-19: Primary | ICD-10-CM

## 2020-04-28 LAB
APPEARANCE UR: CLEAR
BACTERIA URNS QL MICRO: NEGATIVE /HPF
BILIRUB UR QL: NEGATIVE
COLOR UR: NORMAL
EPITH CASTS URNS QL MICRO: NORMAL /LPF
GLUCOSE UR STRIP.AUTO-MCNC: NEGATIVE MG/DL
HCG UR QL: NEGATIVE
HGB UR QL STRIP: NEGATIVE
KETONES UR QL STRIP.AUTO: NEGATIVE MG/DL
LEUKOCYTE ESTERASE UR QL STRIP.AUTO: NEGATIVE
NITRITE UR QL STRIP.AUTO: NEGATIVE
PH UR STRIP: 6.5 [PH] (ref 5–8)
PROT UR STRIP-MCNC: NEGATIVE MG/DL
RBC #/AREA URNS HPF: NORMAL /HPF (ref 0–5)
SP GR UR REFRACTOMETRY: <1.005 (ref 1–1.03)
UR CULT HOLD, URHOLD: NORMAL
UROBILINOGEN UR QL STRIP.AUTO: 0.2 EU/DL (ref 0.2–1)
WBC URNS QL MICRO: NORMAL /HPF (ref 0–4)

## 2020-04-28 PROCEDURE — 81025 URINE PREGNANCY TEST: CPT

## 2020-04-28 PROCEDURE — 99285 EMERGENCY DEPT VISIT HI MDM: CPT

## 2020-04-28 PROCEDURE — 81001 URINALYSIS AUTO W/SCOPE: CPT

## 2020-04-28 NOTE — ED NOTES
Pt extremely anxious regarding vital sign numbers on monitor. Reiterated her vitals change all day and her current vitals are normal.  Pt still anxious saying she is home alone and wonders what her vitals are at home which is why she kept taking her temp over and over. Educated pt on normal vital signs and return precautions to ED or urgent care. Pt verbalized understanding.  Monitor turned off to decrease pts anxiety

## 2020-04-28 NOTE — ED NOTES
Bedside and Verbal shift change report given to Marcus Mcdowell (oncoming nurse) by Ant Garcia (offgoing nurse). Report included the following information SBAR and ED Summary.

## 2020-04-28 NOTE — ED PROVIDER NOTES
4/13 -- appoximate onset of sx      Last night was eating ribs and wesley greens, patient only does not have a allergy to these foods. She does have a latex and an allergy. She developed some oral blisters and a burning pruritic rash on her arms which is now resolved completely. She states that this morning she felt like her temperature was \"dropping\" based on tactile assessment, was somewhat diaphoretic. Denies chest pain, increased shortness of breath. Has had persistent nonproductive cough. She did take her temperature and noticed that it was 96 °F.  No wheezing, leg swelling, change in her medication regimen. Patient was \"concern for her thyroid\" given that her temperature was fluctuating. Of note her TSH screening test have been normal of late.            Past Medical History:   Diagnosis Date    BMI 30.0-30.9,adult 3/17/2017    IBS (irritable bowel syndrome)     IUD (intrauterine device) in place 3/17/2017    Pre-diabetes 4/19/2017    Stool color black     Vitamin D deficiency 4/19/2017       Past Surgical History:   Procedure Laterality Date    HX ORTHOPAEDIC      Lt wrist cyst removed         Family History:   Problem Relation Age of Onset    Asthma Mother     Hypertension Father     Diabetes Father     Elevated Lipids Father     Cancer Sister        Social History     Socioeconomic History    Marital status: SINGLE     Spouse name: Not on file    Number of children: Not on file    Years of education: Not on file    Highest education level: Not on file   Occupational History    Not on file   Social Needs    Financial resource strain: Not on file    Food insecurity     Worry: Not on file     Inability: Not on file    Transportation needs     Medical: Not on file     Non-medical: Not on file   Tobacco Use    Smoking status: Never Smoker    Smokeless tobacco: Never Used   Substance and Sexual Activity    Alcohol use: No    Drug use: No    Sexual activity: Yes     Partners: Male     Birth control/protection: I.U.D. Lifestyle    Physical activity     Days per week: Not on file     Minutes per session: Not on file    Stress: Not on file   Relationships    Social connections     Talks on phone: Not on file     Gets together: Not on file     Attends Mu-ism service: Not on file     Active member of club or organization: Not on file     Attends meetings of clubs or organizations: Not on file     Relationship status: Not on file    Intimate partner violence     Fear of current or ex partner: Not on file     Emotionally abused: Not on file     Physically abused: Not on file     Forced sexual activity: Not on file   Other Topics Concern    Not on file   Social History Narrative    Not on file         ALLERGIES: Latex; Banana; and Macrobid [nitrofurantoin monohyd/m-cryst]    Review of Systems   Constitutional: Positive for chills, diaphoresis, fatigue and fever. Eyes: Negative for photophobia. Respiratory: Positive for cough. Negative for shortness of breath. Cardiovascular: Negative for chest pain. Gastrointestinal: Negative for abdominal pain. Genitourinary: Negative for dysuria. Musculoskeletal: Negative for back pain. Neurological: Negative for light-headedness and headaches. Psychiatric/Behavioral: Negative for confusion. All other systems reviewed and are negative. Vitals:    04/28/20 0723 04/28/20 0728 04/28/20 0758   BP: 128/64  118/57   Pulse: 88  90   Resp: 18  16   Temp: 98.8 °F (37.1 °C)  98.7 °F (37.1 °C)   SpO2: 96% 97% 99%   Weight:   72.6 kg (160 lb)            Physical Exam  Vitals signs reviewed. Constitutional:       General: She is not in acute distress. HENT:      Head: Normocephalic and atraumatic. Mouth/Throat:      Mouth: Mucous membranes are moist.      Pharynx: Oropharynx is clear. Cardiovascular:      Rate and Rhythm: Normal rate and regular rhythm. Heart sounds: Normal heart sounds.    Pulmonary:      Effort: Pulmonary effort is normal.      Breath sounds: Normal breath sounds. Abdominal:      Tenderness: There is no abdominal tenderness. There is no guarding or rebound. Musculoskeletal: Normal range of motion. Skin:     General: Skin is warm and dry. Capillary Refill: Capillary refill takes less than 2 seconds. Neurological:      General: No focal deficit present. Mental Status: She is alert and oriented to person, place, and time. Psychiatric:         Mood and Affect: Mood normal.          MDM  Number of Diagnoses or Management Options  Allergic reaction, initial encounter:   Chills:   COVID-19:   Diagnosis management comments: Patient presenting today with subjective fluctuating temperatures although all temperatures obtained this morning have been within the normal range. She also mentioned that her menstrual period was over a week late. Will check urine pregnancy, routine urinalysis. In regards to her thyroid function is unlikely that given that she is recovering from COVID-19 infection that this has resulted in a thyroid condition. Will defer to outpatient physician to check these levels when she has convalesced. No evidence of thyroid storm. Of note she also had a secondary complaint of a rash that has erupted over her right breast although this is not apparent at this time. Phonation clear, no stridor, lungs clear. No evidence of pneumonia. O2 sat was 96-98 on room air during my evaluation while she was speaking.         ED Course as of Apr 28 0847   Tue Apr 28, 2020   0833 URINALYSIS W/MICROSCOPIC [NS]      ED Course User Index  [NS] Chucho Law MD       Procedures

## 2020-04-28 NOTE — ED TRIAGE NOTES
Pt checked her temp around 0600 per pt it was 96.6 oral. Pt stated nails started to get pale and body felt hot. Pt has concerns pertaining to her thyroid. Pt temp currently 98.8 and pt denies feeling hot currently. About 13 days ago pt tested positive for COVID 19 at [facility].

## 2020-04-28 NOTE — DISCHARGE INSTRUCTIONS
Results your urine testing is unremarkable. There is no evidence of infection, dehydration or pregnancy. Symptoms of illness may last from 2 to 3 weeks, you may have milder symptoms which persist beyond this period of time. If you has a new or concerning symptoms please first call your primary care physician, if you are unable to reach them or have severe symptoms especially shortness of breath, chest pain, fainting amongst others please return.

## 2020-04-29 ENCOUNTER — PATIENT OUTREACH (OUTPATIENT)
Dept: CARDIOLOGY CLINIC | Age: 45
End: 2020-04-29

## 2020-04-29 NOTE — PROGRESS NOTES
Patient contacted regarding COVID-19 diagnosis. Care Transition Nurse/ Ambulatory Care Manager contacted the patient by telephone to perform post discharge assessment. Verified name and  with patient as identifiers. Provided introduction to self, and explanation of the CTN/ACM role, and reason for call due to risk factors for infection and/or exposure to COVID-19. Symptoms reviewed with patient who verbalized the following symptoms: fever, pain or aching joints and cough. Due to no new or worsening symptoms encounter was not routed to provider for escalation. Patient has following risk factors of: no known risk factors. CTN/ACM reviewed discharge instructions, medical action plan and red flags such as increased shortness of breath, increasing fever and signs of decompensation with patient who verbalized understanding. Discussed exposure protocols and quarantine with CDC Guidelines What to do if you are sick with coronavirus disease .  Patient was given an opportunity for questions and concerns. The patient agrees to contact the [facility] exposure line [phone], local [facility] department [facility]  ([phone]) and PCP office for questions related to their healthcare. CTN/ACM provided contact information for future needs. Reviewed and educated patient on any new and changed medications related to discharge diagnosis. Patient/family/caregiver given information for [employer] and agrees to enroll no    Plan for follow-up call in 5-7 days based on severity of symptoms and risk factors. Patient reports her symptoms are improving, states her temp is 99.9 last night but became normal after taking tylenol. She reports cough and pain/pressure in her neck/head that \"feels like it is sinus related\". She will take OTC and see if this resolves, if not she will contact PCP.

## 2020-05-02 ENCOUNTER — OFFICE VISIT (OUTPATIENT)
Dept: URGENT CARE | Age: 45
End: 2020-05-02

## 2020-05-02 VITALS — HEART RATE: 81 BPM | RESPIRATION RATE: 16 BRPM | TEMPERATURE: 98.8 F | OXYGEN SATURATION: 98 %

## 2020-05-02 DIAGNOSIS — L30.9 DERMATITIS: ICD-10-CM

## 2020-05-02 DIAGNOSIS — U07.1 COVID-19: Primary | ICD-10-CM

## 2020-05-02 NOTE — PROGRESS NOTES
This patient was seen in [facility] at [address] [place] [address] Urgent Care while in their vehicle due to COVID-19 pandemic with PPE and focused examination in order to decrease community viral transmission. The patient/guardian gave verbal consent to treat. The history is limited by a language barrier. Other        tested positive for covid few weeks before  Recovering well    Concern for some dryness on rt hand with rash  Also has noticed some paleness on rt lower eyelid conjunctiva  No fatigue/shortness of breath     Past Medical History:   Diagnosis Date    BMI 30.0-30.9,adult 3/17/2017    IBS (irritable bowel syndrome)     IUD (intrauterine device) in place 3/17/2017    Pre-diabetes 4/19/2017    Stool color black     Vitamin D deficiency 4/19/2017        Past Surgical History:   Procedure Laterality Date    HX ORTHOPAEDIC      Lt wrist cyst removed         Family History   Problem Relation Age of Onset    Asthma Mother     Hypertension Father     Diabetes Father     Elevated Lipids Father     Cancer Sister         Social History     Socioeconomic History    Marital status: SINGLE     Spouse name: Not on file    Number of children: Not on file    Years of education: Not on file    Highest education level: Not on file   Occupational History    Not on file   Social Needs    Financial resource strain: Not on file    Food insecurity     Worry: Not on file     Inability: Not on file    Transportation needs     Medical: Not on file     Non-medical: Not on file   Tobacco Use    Smoking status: Never Smoker    Smokeless tobacco: Never Used   Substance and Sexual Activity    Alcohol use: No    Drug use: No    Sexual activity: Yes     Partners: Male     Birth control/protection: I.U.D.    Lifestyle    Physical activity     Days per week: Not on file     Minutes per session: Not on file    Stress: Not on file   Relationships    Social connections     Talks on phone: Not on file     Gets together: Not on file     Attends Episcopal service: Not on file     Active member of club or organization: Not on file     Attends meetings of clubs or organizations: Not on file     Relationship status: Not on file    Intimate partner violence     Fear of current or ex partner: Not on file     Emotionally abused: Not on file     Physically abused: Not on file     Forced sexual activity: Not on file   Other Topics Concern    Not on file   Social History Narrative    Not on file                ALLERGIES: Latex; Banana; and Macrobid [nitrofurantoin monohyd/m-cryst]    Review of Systems   Eyes: Negative for redness and itching. Skin: Positive for rash. All other systems reviewed and are negative. Vitals:    05/02/20 1100   Pulse: 81   Resp: 16   Temp: 98.8 °F (37.1 °C)   SpO2: 98%       Physical Exam  Vitals signs and nursing note reviewed. Constitutional:       General: She is not in acute distress. Appearance: She is not ill-appearing or toxic-appearing. Pulmonary:      Effort: Pulmonary effort is normal. No respiratory distress. Breath sounds: Normal breath sounds. No stridor. No wheezing, rhonchi or rales. Skin:     Comments: Dry /rough patch on rt hand wrist area  No specific pattern          MDM    Procedures        ICD-10-CM ICD-9-CM    1. COVID-19 U07.1      J98.8      recovering from covid- was positive 2-3 weeks before   2. Dermatitis L30.9 692.9      Reassured- use steroid cream      No orders of the defined types were placed in this encounter. No results found for any visits on 05/02/20. The patients condition was discussed with the patient and they understand. The patient is to follow up with primary care doctor. If signs and symptoms become worse the pt is to go to the ER. The patient is to take medications as prescribed.

## 2020-05-04 ENCOUNTER — VIRTUAL VISIT (OUTPATIENT)
Dept: FAMILY MEDICINE CLINIC | Age: 45
End: 2020-05-04

## 2020-05-04 VITALS — SYSTOLIC BLOOD PRESSURE: 147 MMHG | HEART RATE: 97 BPM | TEMPERATURE: 98.1 F | DIASTOLIC BLOOD PRESSURE: 109 MMHG

## 2020-05-04 DIAGNOSIS — L30.9 DERMATITIS: ICD-10-CM

## 2020-05-04 DIAGNOSIS — F41.8 ANXIETY ABOUT HEALTH: ICD-10-CM

## 2020-05-04 DIAGNOSIS — F51.02 ADJUSTMENT INSOMNIA: ICD-10-CM

## 2020-05-04 DIAGNOSIS — H11.89 PALE CONJUNCTIVA: ICD-10-CM

## 2020-05-04 DIAGNOSIS — U07.1 COVID-19: ICD-10-CM

## 2020-05-04 DIAGNOSIS — F43.22 ADJUSTMENT DISORDER WITH ANXIOUS MOOD: Primary | ICD-10-CM

## 2020-05-04 NOTE — PROGRESS NOTES
Consent: Severiano Primmer, who was seen by synchronous (real-time) audio-video technology, and/or her healthcare decision maker, is aware that this patient-initiated, Telehealth encounter on 5/4/2020 is a billable service, with coverage as determined by her insurance carrier. She is aware that she may receive a bill and has provided verbal consent to proceed: Yes. Severiano Primmer is a 39 y.o. female    Patient's last menstrual period was 03/07/2020 (exact date). has a past medical history of BMI 30.0-30.9,adult (3/17/2017), IBS (irritable bowel syndrome), IUD (intrauterine device) in place (3/17/2017), Pre-diabetes (4/19/2017), Stool color black, and Vitamin D deficiency (4/19/2017). [facility] She is a CNA nurse. Symptomatic 5 days of cough, fever, headache, sore throat, myalgias, lost sense of smell. She saw me 4/20/2020 on virtual visit. 4/28/2020 she went to ED for rash on arms, and lower temp    5/2/2020 went to urgent care c/o paleness and rash. Reassurance was given and she bought OTC hydrocortisone and it is helping on her hands. Today she c/o paleness eye lids. Rash on arms and feet. Feb CBC was WNL    She's more concerns of why all these things are happening to her after she was diagnosed with COVID 19. Denies SOB, wheezing. Her BP is high today, but for the rest of last year have been at goal with 1 other exception. She admit to being anxious about her health and all the things above. She asked to check her blood for various things. We discussed that we'll get a CBC to check for anemia. Insomnia   Currently she's not completely agreeable to the above diagnosis. She do want f/u in about 10 days. Reviewed: active problem list, medication list, allergies, notes from last encounter, lab results    A comprehensive review of systems was negative except for that written in the HPI.       Assessment & Plan: Diagnoses and all orders for this visit:    1. Adjustment disorder with anxious mood    2. Anxiety about health    3. Adjustment insomnia    4. COVID-19    5. Pale conjunctiva  -     CBC W/O DIFF    6. Dermatitis      Follow-up and Dispositions    · Return in about 10 days (around 5/14/2020) for adjustment. I spent at least 25 minutes with this established patient, and >50% of the time was spent counseling and/or coordinating care regarding anxiety  712  Subjective:   Asia Powers is a 39 y.o. female who was seen for Rash; Eyelid Inflammation; and Fatigue      Prior to Admission medications    Medication Sig Start Date End Date Taking? Authorizing Provider   promethazine-dextromethorphan (PROMETHAZINE-DM) 6.25-15 mg/5 mL syrup Take 5 mL by mouth nightly as needed for Cough. 4/18/20  Yes Ruddy Glynn MD   fluticasone propionate (FLONASE) 50 mcg/actuation nasal spray 2 Sprays by Both Nostrils route daily. 3/6/20  Yes Ghazala Whitmore MD   multivitamin (ONE A DAY) tablet Take 1 Tab by mouth daily.     Provider, Historical     Allergies   Allergen Reactions    Latex Rash    Banana Other (comments)     GERD    Macrobid [Nitrofurantoin Monohyd/M-Cryst] Hives       Past Medical History:   Diagnosis Date    BMI 30.0-30.9,adult 3/17/2017    IBS (irritable bowel syndrome)     IUD (intrauterine device) in place 3/17/2017    Pre-diabetes 4/19/2017    Stool color black     Vitamin D deficiency 4/19/2017     Past Surgical History:   Procedure Laterality Date    HX ORTHOPAEDIC      Lt wrist cyst removed         Objective:   Vital Signs: (As obtained by patient/caregiver at home)  Visit Vitals  BP (!) 147/109   Pulse 97   Temp 98.1 °F (36.7 °C) (Oral)   LMP 03/07/2020 (Exact Date)        Constitutional: [x] Appears well-developed and well-nourished [x] No apparent distress      [] Abnormal -     Mental status: [x] Alert and awake  [x] Oriented to person/place/time [x] Able to follow commands    [] Abnormal -     Eyes:   EOM    [x]  Normal    [] Abnormal -   Sclera  [x]  Normal    [] Abnormal -          Discharge [x]  None visible   [] Abnormal -     HENT: [x] Normocephalic, atraumatic  [] Abnormal -   [] Mouth/Throat: Mucous membranes are moist    External Ears [x] Normal  [] Abnormal -    Neck: [x] No visualized mass [] Abnormal -     Pulmonary/Chest: [x] Respiratory effort normal   [x] No visualized signs of difficulty breathing or respiratory distress        [] Abnormal -      Musculoskeletal:   [x] Normal gait with no signs of ataxia         [x] Normal range of motion of neck        [] Abnormal -     Neurological:        [x] No Facial Asymmetry (Cranial nerve 7 motor function) (limited exam due to video visit)          [x] No gaze palsy        [] Abnormal -          Skin:        [] No significant exanthematous lesions or discoloration noted on facial skin         [x] Abnormal - pale eyelids. Eczema mild on dorsum of hands and very minor on foot. Psychiatric:       [x] Normal Affect [] Abnormal -        [x] No Hallucinations      We discussed the expected course, resolution and complications of the diagnosis(es) in detail. Medication risks, benefits, costs, interactions, and alternatives were discussed as indicated. I advised her to contact the office if her condition worsens, changes or fails to improve as anticipated. She expressed understanding with the diagnosis(es) and plan. Anastasiya Martinez is a 39 y.o. female being evaluated by a video visit encounter for concerns as above. A caregiver was present when appropriate. Due to this being a TeleHealth encounter (During PAXIY-71 public health emergency), evaluation of the following organ systems was limited: Vitals/Constitutional/EENT/Resp/CV/GI//MS/Neuro/Skin/Heme-Lymph-Imm.   Pursuant to the emergency declaration under the [address], [address] and Response Supplemental Appropriations Act, this Virtual  Visit was conducted, with patient's (and/or legal guardian's) consent, to reduce the patient's risk of exposure to COVID-19 and provide necessary medical care. Services were provided through a video synchronous discussion virtually to substitute for in-person clinic visit. Patient and provider were located at their individual homes.         Nino Mattson MD

## 2020-05-04 NOTE — PROGRESS NOTES
Chief Complaint   Patient presents with    Rash    Eyelid Inflammation    Fatigue     Was seen at [facility].    1. Have you been to the ER, urgent care clinic since your last visit? Hospitalized since your last visit? yes    2. Have you seen or consulted any other health care providers outside of the [address] [place] [address] since your last visit? Include any pap smears or colon screening.  no

## 2020-05-07 ENCOUNTER — PATIENT OUTREACH (OUTPATIENT)
Dept: CARDIOLOGY CLINIC | Age: 45
End: 2020-05-07

## 2020-05-07 NOTE — PROGRESS NOTES
05/07/20  CTN unable to contact patient on phone, LM on  requesting return call. CTN notes patient went to [facility] and had telephonic Videohealth visit with PCP last week, is not c/o any worsening of COVID19 symptoms. Has rash that she is treating with cortisone cream and general anxiety . PCP will follow up in 10 days.  CTN will call pt next week if no return call received---latesha

## 2020-05-12 ENCOUNTER — PATIENT OUTREACH (OUTPATIENT)
Dept: CARDIOLOGY CLINIC | Age: 45
End: 2020-05-12

## 2020-05-12 NOTE — PROGRESS NOTES
Patient resolved from Transition of Care episode on 5/12/20  Patient/family has been provided the following resources and education related to COVID-19:                         Signs, symptoms and red flags related to COVID-19            CDC exposure and quarantine guidelines            Conduit exposure contact - [phone]            Contact for their local Department of Health                 Patient currently reports that the following symptoms have improved:  no worsening symptoms. Patient reports she still has a very mild cough, otherwise no other symptoms at this time. No further outreach scheduled with this CTN/ACM. Episode of Care resolved. Patient has this CTN/ACM contact information if future needs arise.

## 2020-05-14 ENCOUNTER — VIRTUAL VISIT (OUTPATIENT)
Dept: FAMILY MEDICINE CLINIC | Age: 45
End: 2020-05-14

## 2020-05-14 ENCOUNTER — TELEPHONE (OUTPATIENT)
Dept: FAMILY MEDICINE CLINIC | Age: 45
End: 2020-05-14

## 2020-05-14 VITALS
HEIGHT: 62 IN | SYSTOLIC BLOOD PRESSURE: 137 MMHG | BODY MASS INDEX: 28.52 KG/M2 | HEART RATE: 80 BPM | DIASTOLIC BLOOD PRESSURE: 95 MMHG | WEIGHT: 155 LBS

## 2020-05-14 DIAGNOSIS — Z79.899 ENCOUNTER FOR LONG-TERM (CURRENT) USE OF MEDICATIONS: ICD-10-CM

## 2020-05-14 DIAGNOSIS — F41.8 ANXIETY ABOUT HEALTH: ICD-10-CM

## 2020-05-14 DIAGNOSIS — Z00.00 ANNUAL PHYSICAL EXAM: Primary | ICD-10-CM

## 2020-05-14 DIAGNOSIS — R73.03 PREDIABETES: ICD-10-CM

## 2020-05-14 DIAGNOSIS — Z13.220 SCREENING CHOLESTEROL LEVEL: ICD-10-CM

## 2020-05-14 RX ORDER — MELATONIN
1000 DAILY
COMMUNITY
End: 2021-02-02 | Stop reason: ALTCHOICE

## 2020-05-14 RX ORDER — LANOLIN ALCOHOL/MO/W.PET/CERES
1000 CREAM (GRAM) TOPICAL DAILY
COMMUNITY
End: 2021-02-02 | Stop reason: ALTCHOICE

## 2020-05-14 NOTE — PROGRESS NOTES
Chief Complaint   Patient presents with    Anxiety     10 day check    1. Have you been to the ER, urgent care clinic since your last visit? Hospitalized since your last visit? no    2. Have you seen or consulted any other health care providers outside of the [address] [place] [address] since your last visit? Include any pap smears or colon screening.  no

## 2020-05-14 NOTE — PROGRESS NOTES
Consent: Kennedy Montes, who was seen by synchronous (real-time) audio-video technology, and/or her healthcare decision maker, is aware that this patient-initiated, Telehealth encounter on 5/14/2020 is a billable service, with coverage as determined by her insurance carrier. She is aware that she may receive a bill and has provided verbal consent to proceed: Yes. Kennedy Montes is a 39 y.o. female    Patient's last menstrual period was 03/07/2020 (exact date). has a past medical history of BMI 30.0-30.9,adult (3/17/2017), IBS (irritable bowel syndrome), IUD (intrauterine device) in place (3/17/2017), Pre-diabetes (4/19/2017), Stool color black, and Vitamin D deficiency (4/19/2017). [facility]18[facility]. She is a CNA nurse. Symptomatic 5 days of cough, fever, headache, sore throat, myalgias, lost sense of smell. She saw me 4/20/2020 on virtual visit. 4/28/2020 she went to ED for rash on arms, and lower temp    5/2/2020 went to urgent care c/o paleness and rash. Reassurance was given and she bought OTC hydrocortisone and it is helping on her hands. 5/4/2020 pt c/o paleness eye lids. Ordered CBC she didn't do. She was still testing positive yesterday. Adjustment disorder with anxiety about health  Emotionally she's doing a whole lot better since our last conversation. Sleep is better. Reviewed: active problem list, medication list, allergies, notes from last encounter, lab results    A comprehensive review of systems was negative except for that written in the HPI. Assessment & Plan:   Diagnoses and all orders for this visit:    1. Annual physical exam  -     CBC W/O DIFF  -     METABOLIC PANEL, BASIC  -     HEPATIC FUNCTION PANEL  -     HEMOGLOBIN A1C W/O EAG  -     TSH 3RD GENERATION  -     LIPID PANEL    2. Prediabetes  -     HEMOGLOBIN A1C W/O EAG    3.  Anxiety about health    4. Screening cholesterol level  -     LIPID PANEL    5. Encounter for long-term (current) use of medications  -     CBC W/O DIFF  -     METABOLIC PANEL, BASIC  -     HEPATIC FUNCTION PANEL  -     HEMOGLOBIN A1C W/O EAG  -     TSH 3RD GENERATION  -     LIPID PANEL        I spent at least 15 minutes with this established patient, and >50% of the time was spent counseling and/or coordinating care regarding annual exam and stress, was positive for covid 19  712  Subjective:   Sasha Iraheta is a 39 y.o. female who was seen for Anxiety      Prior to Admission medications    Medication Sig Start Date End Date Taking? Authorizing Provider   cyanocobalamin 1,000 mcg tablet Take 1,000 mcg by mouth daily. Yes Provider, Historical   cholecalciferol (VITAMIN D3) (1000 Units /25 mcg) tablet Take 1,000 Units by mouth daily. Yes Provider, Historical   ascorbate sodium/Zn/Echin purp (ZINC WITH VIT C AND ECHINACEA PO) Take  by mouth. Yes Provider, Historical   homeopathic drugs (SAMBUCUS PO) Take  by mouth. Yes Provider, Historical   promethazine-dextromethorphan (PROMETHAZINE-DM) 6.25-15 mg/5 mL syrup Take 5 mL by mouth nightly as needed for Cough. 4/18/20  Yes Rafael King MD   fluticasone propionate (FLONASE) 50 mcg/actuation nasal spray 2 Sprays by Both Nostrils route daily.  3/6/20  Yes Alvaro Pleitez MD     Allergies   Allergen Reactions    Latex Rash    Banana Other (comments)     GERD    Kiwi Other (comments)     Itchy throat    Macrobid [Nitrofurantoin Monohyd/M-Cryst] Hives       Past Medical History:   Diagnosis Date    BMI 30.0-30.9,adult 3/17/2017    IBS (irritable bowel syndrome)     IUD (intrauterine device) in place 3/17/2017    Pre-diabetes 4/19/2017    Stool color black     Vitamin D deficiency 4/19/2017     Past Surgical History:   Procedure Laterality Date    HX ORTHOPAEDIC      Lt wrist cyst removed         Objective:   Vital Signs: (As obtained by patient/caregiver at home)  Visit Vitals  BP (!) 137/95   Pulse 80   Ht 5' 2\" (1.575 m)   Wt 155 lb (70.3 kg)   LMP 03/07/2020 (Exact Date)   BMI 28.35 kg/m²        Constitutional: [x] Appears well-developed and well-nourished [x] No apparent distress      [] Abnormal -     Mental status: [x] Alert and awake  [x] Oriented to person/place/time [x] Able to follow commands    [] Abnormal -     Eyes:   EOM    [x]  Normal    [] Abnormal -   Sclera  [x]  Normal    [] Abnormal -          Discharge [x]  None visible   [] Abnormal -     HENT: [x] Normocephalic, atraumatic  [] Abnormal -   [] Mouth/Throat: Mucous membranes are moist    External Ears [x] Normal  [] Abnormal -    Neck: [x] No visualized mass [] Abnormal -     Pulmonary/Chest: [x] Respiratory effort normal   [x] No visualized signs of difficulty breathing or respiratory distress        [] Abnormal -      Musculoskeletal:   [x] Normal gait with no signs of ataxia         [x] Normal range of motion of neck        [] Abnormal -     Neurological:        [x] No Facial Asymmetry (Cranial nerve 7 motor function) (limited exam due to video visit)          [x] No gaze palsy        [] Abnormal -          Skin:        [x] No significant exanthematous lesions or discoloration noted on facial skin                Psychiatric:       [x] Normal Affect [] Abnormal -        [x] No Hallucinations      We discussed the expected course, resolution and complications of the diagnosis(es) in detail. Medication risks, benefits, costs, interactions, and alternatives were discussed as indicated. I advised her to contact the office if her condition worsens, changes or fails to improve as anticipated. She expressed understanding with the diagnosis(es) and plan. Priya Loo is a 39 y.o. female being evaluated by a video visit encounter for concerns as above. A caregiver was present when appropriate.  Due to this being a TeleHealth encounter (During [facility] public health emergency), evaluation of the following organ systems was limited: Vitals/Constitutional/EENT/Resp/CV/GI//MS/Neuro/Skin/Heme-Lymph-Imm. Pursuant to the emergency declaration under the [address] [place] [address], Counts include 234 beds at the [facility] waiver authority and the [employer] Act, this Virtual  Visit was conducted, with patient's (and/or legal guardian's) consent, to reduce the patient's risk of exposure to COVID-19 and provide necessary medical care. Services were provided through a video synchronous discussion virtually to substitute for in-person clinic visit. Patient and provider were located at their individual homes.         Melo Crystal MD

## 2020-05-14 NOTE — TELEPHONE ENCOUNTER
Pt calling back after VV per Dr Omer Cisneros needs to be faxed - [facility], she will go today     Call and confirm         [phone]

## 2020-05-20 ENCOUNTER — TELEPHONE (OUTPATIENT)
Dept: FAMILY MEDICINE CLINIC | Age: 45
End: 2020-05-20

## 2020-05-20 NOTE — TELEPHONE ENCOUNTER
Spoke with patient. She said that she had to wait to get labs drawn til COVID-19 neg. She got report today, she's neg now.

## 2020-05-20 NOTE — TELEPHONE ENCOUNTER
----- Message from Cornell Hylton sent at 5/20/2020  2:23 PM EDT -----  Regarding: Dr. Theodore Spring first and last name: N/A  Reason for call: Pt stated that she called yesterday in regards to her recent lab orders to speak to Dr. Ted Elmore nurse, but she has still not heard back yet.   Callback required yes/no and why: Yes  Best contact number(s): [phone]  Details to clarify the request: N/A

## 2020-05-23 LAB
ALBUMIN SERPL-MCNC: 4.6 G/DL (ref 3.8–4.8)
ALP SERPL-CCNC: 50 IU/L (ref 39–117)
ALT SERPL-CCNC: 12 IU/L (ref 0–32)
AST SERPL-CCNC: 14 IU/L (ref 0–40)
BILIRUB DIRECT SERPL-MCNC: 0.09 MG/DL (ref 0–0.4)
BILIRUB SERPL-MCNC: 0.3 MG/DL (ref 0–1.2)
BUN SERPL-MCNC: 9 MG/DL (ref 6–24)
BUN/CREAT SERPL: 13 (ref 9–23)
CALCIUM SERPL-MCNC: 9.4 MG/DL (ref 8.7–10.2)
CHLORIDE SERPL-SCNC: 98 MMOL/L (ref 96–106)
CHOLEST SERPL-MCNC: 213 MG/DL (ref 100–199)
CO2 SERPL-SCNC: 21 MMOL/L (ref 20–29)
CREAT SERPL-MCNC: 0.72 MG/DL (ref 0.57–1)
ERYTHROCYTE [DISTWIDTH] IN BLOOD BY AUTOMATED COUNT: 14.5 % (ref 11.7–15.4)
GLUCOSE SERPL-MCNC: 85 MG/DL (ref 65–99)
HBA1C MFR BLD: 5.8 % (ref 4.8–5.6)
HCT VFR BLD AUTO: 36.1 % (ref 34–46.6)
HDLC SERPL-MCNC: 49 MG/DL
HGB BLD-MCNC: 11.6 G/DL (ref 11.1–15.9)
LDLC SERPL CALC-MCNC: 121 MG/DL (ref 0–99)
MCH RBC QN AUTO: 26.7 PG (ref 26.6–33)
MCHC RBC AUTO-ENTMCNC: 32.1 G/DL (ref 31.5–35.7)
MCV RBC AUTO: 83 FL (ref 79–97)
PLATELET # BLD AUTO: 276 X10E3/UL (ref 150–450)
POTASSIUM SERPL-SCNC: 4.2 MMOL/L (ref 3.5–5.2)
PROT SERPL-MCNC: 7.2 G/DL (ref 6–8.5)
RBC # BLD AUTO: 4.35 X10E6/UL (ref 3.77–5.28)
SODIUM SERPL-SCNC: 138 MMOL/L (ref 134–144)
TRIGL SERPL-MCNC: 217 MG/DL (ref 0–149)
TSH SERPL DL<=0.005 MIU/L-ACNC: 1.47 UIU/ML (ref 0.45–4.5)
VLDLC SERPL CALC-MCNC: 43 MG/DL (ref 5–40)
WBC # BLD AUTO: 7 X10E3/UL (ref 3.4–10.8)

## 2020-05-28 ENCOUNTER — VIRTUAL VISIT (OUTPATIENT)
Dept: FAMILY MEDICINE CLINIC | Age: 45
End: 2020-05-28

## 2020-05-28 VITALS
BODY MASS INDEX: 28.63 KG/M2 | WEIGHT: 155.6 LBS | SYSTOLIC BLOOD PRESSURE: 123 MMHG | HEIGHT: 62 IN | HEART RATE: 89 BPM | DIASTOLIC BLOOD PRESSURE: 84 MMHG

## 2020-05-28 DIAGNOSIS — U07.1 COVID-19: ICD-10-CM

## 2020-05-28 DIAGNOSIS — E78.00 HIGH CHOLESTEROL: Primary | ICD-10-CM

## 2020-05-28 DIAGNOSIS — R73.03 PREDIABETES: ICD-10-CM

## 2020-05-28 DIAGNOSIS — Z71.3 WEIGHT LOSS COUNSELING, ENCOUNTER FOR: ICD-10-CM

## 2020-05-28 PROBLEM — E61.1 IRON DEFICIENCY: Status: RESOLVED | Noted: 2017-03-17 | Resolved: 2020-05-28

## 2020-05-28 PROBLEM — Z79.899 ENCOUNTER FOR LONG-TERM (CURRENT) USE OF MEDICATIONS: Status: RESOLVED | Noted: 2017-03-17 | Resolved: 2020-05-28

## 2020-05-28 NOTE — PROGRESS NOTES
Chief Complaint   Patient presents with    Results    Cough       1. Have you been to the ER, urgent care clinic since your last visit? Hospitalized since your last visit? no    2. Have you seen or consulted any other health care providers outside of the [address] [place] [address] since your last visit? Include any pap smears or colon screening.   no

## 2021-01-04 ENCOUNTER — OFFICE VISIT (OUTPATIENT)
Dept: PRIMARY CARE CLINIC | Age: 46
End: 2021-01-04
Payer: COMMERCIAL

## 2021-01-04 VITALS
WEIGHT: 169 LBS | RESPIRATION RATE: 16 BRPM | OXYGEN SATURATION: 98 % | TEMPERATURE: 97 F | HEART RATE: 85 BPM | BODY MASS INDEX: 31.1 KG/M2 | DIASTOLIC BLOOD PRESSURE: 77 MMHG | HEIGHT: 62 IN | SYSTOLIC BLOOD PRESSURE: 116 MMHG

## 2021-01-04 DIAGNOSIS — H18.891 CORNEAL IRRITATION OF RIGHT EYE: Primary | ICD-10-CM

## 2021-01-04 PROCEDURE — 99213 OFFICE O/P EST LOW 20 MIN: CPT | Performed by: NURSE PRACTITIONER

## 2021-01-04 RX ORDER — MOXIFLOXACIN 5 MG/ML
1 SOLUTION/ DROPS OPHTHALMIC 3 TIMES DAILY
Qty: 1 BOTTLE | Refills: 0 | Status: SHIPPED | OUTPATIENT
Start: 2021-01-04 | End: 2021-01-11

## 2021-01-04 NOTE — PROGRESS NOTES
HISTORY OF PRESENT ILLNESS  Jey De Souza is a 39 y.o. female. HPI  Chief Complaint   Patient presents with    Eye Problem     right eye irrated, woke up eye crusty, sore, contact in or out ?? Pt presents with complaints of right eye irritation and redness for one day. Started last night, felt like she had something in her eye. Tried to remove her contact but not sure if it was removed or not. All night felt like something was in it. .  She touched and rubbed her eye often. Today woke up with right eye swelling and crusty drainage. Still feels like something is in it. Feels well otherwise. Denies any fevers, sore throat, drainage, or congestion. Past Medical History:   Diagnosis Date    BMI 30.0-30.9,adult 3/17/2017    COVID-19 5/28/2020    IBS (irritable bowel syndrome)     IUD (intrauterine device) in place 3/17/2017    Pre-diabetes 4/19/2017    Stool color black     Vitamin D deficiency 4/19/2017     Past Surgical History:   Procedure Laterality Date    HX ORTHOPAEDIC      Lt wrist cyst removed     Allergies   Allergen Reactions    Latex Rash    Banana Other (comments)     GERD    Kiwi Other (comments)     Itchy throat    Macrobid [Nitrofurantoin Monohyd/M-Cryst] Hives       Review of Systems   Constitutional: Negative for fever. HENT: Negative for congestion, sinus pain and sore throat. Eyes: Positive for blurred vision, pain, discharge and redness. Physical Exam  Constitutional:       General: She is not in acute distress. Appearance: Normal appearance. She is not ill-appearing or toxic-appearing. Eyes:      General:         Right eye: No foreign body, discharge or hordeolum. Left eye: No foreign body, discharge or hordeolum. Extraocular Movements:      Right eye: Normal extraocular motion. Left eye: Normal extraocular motion. Conjunctiva/sclera:      Right eye: Right conjunctiva is injected.       Left eye: Left conjunctiva is not injected. Comments: Right superior lid with mild swelling, no erythema. Right eye with corneal injection. No contact or foreign body seen. No discharge. No uptake with fluorscein staining. Neurological:      Mental Status: She is alert. ASSESSMENT and PLAN    ICD-10-CM ICD-9-CM    1. Corneal irritation of right eye  H18.891 371.89      Orders Placed This Encounter    moxifloxacin (VIGAMOX) 0.5 % ophthalmic solution     Warm compress and meds per orders. No contact lens use until symptoms resolve. F/u with eye specialist if no improvement in next 24 hours.     Parminder Salas, TIMOTHY

## 2021-01-04 NOTE — LETTER
NOTIFICATION RETURN TO WORK / SCHOOL 
 
1/4/2021 10:48 AM 
 
Ms. Ann Quevedo 
[address] 
[address] 
[place] [id number] 
 
 
To Whom It May Concern: 
 
Ann Quevedo is currently under the care of [place] [address]. 
 
She will return to work/school on 1/5/2021. 
 
If there are questions or concerns please have the patient contact our office. 
 
 
 
Sincerely, 
 
 
Carmen London NP

## 2021-01-04 NOTE — PROGRESS NOTES
Chief Complaint   Patient presents with    Eye Problem     right eye irrated, woke up eye crusty, sore, contact in or out ??       1. Have you been to the ER, urgent care clinic since your last visit? Yes went to [facility] clinic tested for covid in April 2020. Was postive. Hospitalized since your last visit? no    2. Have you seen or consulted any other health care providers outside of the [address] [place] [address] since your last visit? Include any pap smears or colon screening.   July papsmear normal.

## 2021-01-26 ENCOUNTER — TELEPHONE (OUTPATIENT)
Dept: FAMILY MEDICINE CLINIC | Age: 46
End: 2021-01-26

## 2021-01-26 NOTE — TELEPHONE ENCOUNTER
----- Message from Katey Vargas sent at 1/26/2021  2:22 PM EST -----  Regarding: Dr. Arenas/ telephone  General Message/Vendor Calls    Caller's first and last name: pt       Reason for call: pt states her job is requiring her to get the covid vaccine. However, pt states that she had covid before and developed natural antibodies already. Also, pt states that she has severe allergies which is the main reason why she wants to avoid getting the covid vaccine. Pt is requesting a medical letter from provider so that she can avoid getting vaccinated      Callback required yes/no and why: yes       Best contact number(s): [phone]      Details to clarify the request: denise Vargas

## 2021-01-27 ENCOUNTER — TRANSCRIBE ORDER (OUTPATIENT)
Dept: FAMILY MEDICINE CLINIC | Age: 46
End: 2021-01-27

## 2021-01-27 NOTE — TELEPHONE ENCOUNTER
MD Anastasia Marques, LPN   Caller: Unspecified Natalie Gregory,  2:29 PM)             Not seen for 8 months. On my medical hx no record of severe allergies hx.       No letter since not seen. Myrtle Cormier unlikely will do letter. I can try and convince her to get it. Melba Salgado MD   1/26/2021      Lm for patient to call & schedule appt to review allergies for letter.

## 2021-02-02 ENCOUNTER — OFFICE VISIT (OUTPATIENT)
Dept: FAMILY MEDICINE CLINIC | Age: 46
End: 2021-02-02
Payer: COMMERCIAL

## 2021-02-02 VITALS
TEMPERATURE: 96.8 F | SYSTOLIC BLOOD PRESSURE: 120 MMHG | BODY MASS INDEX: 31.47 KG/M2 | RESPIRATION RATE: 16 BRPM | HEART RATE: 82 BPM | DIASTOLIC BLOOD PRESSURE: 62 MMHG | WEIGHT: 171 LBS | HEIGHT: 62 IN | OXYGEN SATURATION: 100 %

## 2021-02-02 DIAGNOSIS — E78.00 HIGH CHOLESTEROL: ICD-10-CM

## 2021-02-02 DIAGNOSIS — Z71.89 EDUCATED ABOUT INFECTION DUE TO SEVERE ACUTE RESPIRATORY SYNDROME CORONAVIRUS 2 (SARS-COV-2): ICD-10-CM

## 2021-02-02 DIAGNOSIS — R06.02 SOB (SHORTNESS OF BREATH): Primary | ICD-10-CM

## 2021-02-02 DIAGNOSIS — K21.9 GASTROESOPHAGEAL REFLUX DISEASE WITHOUT ESOPHAGITIS: ICD-10-CM

## 2021-02-02 DIAGNOSIS — R73.03 PREDIABETES: ICD-10-CM

## 2021-02-02 PROCEDURE — 99214 OFFICE O/P EST MOD 30 MIN: CPT | Performed by: FAMILY MEDICINE

## 2021-02-02 RX ORDER — PHENOL/SODIUM PHENOLATE
20 AEROSOL, SPRAY (ML) MUCOUS MEMBRANE DAILY
Qty: 30 TAB | Refills: 1 | Status: SHIPPED | OUTPATIENT
Start: 2021-02-02 | End: 2022-02-15 | Stop reason: SDUPTHER

## 2021-02-02 NOTE — PROGRESS NOTES
Maryhelen Epley is a 39 y.o. female     has a past medical history of BMI 30.0-30.9,adult (3/17/2017), COVID-19 (2020), IBS (irritable bowel syndrome), IUD (intrauterine device) in place (3/17/2017), Pre-diabetes (2017), Stool color black, and Vitamin D deficiency (2017). She works in a nursing home. She refuses requirement of COVID vaccines. \"i'm willing to get fired, I do part time anyway\". We discussed the vaccines, I had ethical discussion with my colleague. I cannot write a letter to excuse her from the vaccines that's required by regulation by her job and by the CDC to protect the people that she's responsible for. She understands. COVID 19 positive in 2020  Ever since then she's having some SOB with exertional activities. Denies association with CP or any CP. Currently denies CP. Her SPO2 RA today 100%  We'll do initial work up. Prediabetes needs labs monitoring. HLD      Reviewed: active problem list, medication list, allergies, notes from last encounter, lab results    A comprehensive review of systems was negative except for that written in the HPI. Allergies   Allergen Reactions    Latex Rash    Banana Other (comments)     GERD    Kiwi Other (comments)     Itchy throat    Macrobid [Nitrofurantoin Monohyd/M-Cryst] Hives     No current outpatient medications on file prior to visit. No current facility-administered medications on file prior to visit.       Patient Active Problem List   Diagnosis Code    Ganglion of tendon sheath M67.40    IUD (intrauterine device) in place Z97.5    Pre-diabetes R73.03    Vitamin D deficiency E55.9    Hx of one miscarriage Z87.59    Hx of  Z87.59    Enlarged uterus N85.2    COVID-19 U07.1       Visit Vitals  /62   Pulse 82   Temp 96.8 °F (36 °C) (Temporal)   Resp 16   Ht 5' 2\" (1.575 m)   Wt 171 lb (77.6 kg)   SpO2 100%   BMI 31.28 kg/m²     General appearance: alert, cooperative, no distress, appears stated age  Neurologic: Alert and oriented X 3, normal strength and tone, symmetric. Normal without focal findings. Cranial nerves 2-12 intact. Normal coordination and gait. Mental status: Alert, oriented, thought content appropriate, affect: stable, mood-congruent. Head: Normocephalic, without obvious abnormality, atraumatic  Eyes: conjunctivae/corneas clear. PERRL, EOM's intact. Neck: supple, symmetrical, trachea midline, no JVD  Lungs: clear to auscultation bilaterally  Heart: regular rate and rhythm, S1, S2 normal, no murmur, click, rub or gallop  Abdomen: soft, non-tender. Extremities: extremities normal, atraumatic, no cyanosis or edema      Assessment/Plans:    Diagnoses and all orders for this visit:    1. SOB (shortness of breath)  -     CBC W/O DIFF  -     TSH 3RD GENERATION  -     XR CHEST PA LAT; Future  -     EKG, 12 LEAD, INITIAL; Future    2. High cholesterol  -     LIPID PANEL    3. Prediabetes  -     HEMOGLOBIN A1C W/O EAG    4. Gastroesophageal reflux disease without esophagitis  -     Omeprazole delayed release (PRILOSEC D/R) 20 mg tablet; Take 1 Tab by mouth daily. 5. Educated about infection due to severe acute respiratory syndrome coronavirus 2 (SARS-CoV-2)      Discussed plans, risk/benefits of treatments/observations. Through the use of shared decision making, above plans were agreed upon. Medication compliance advised. Patient verbalized understanding. Follow-up and Dispositions    · Return in about 2 weeks (around 2/16/2021) for SOB.            Elaine Stearns MD  2/15/2021

## 2021-02-02 NOTE — PROGRESS NOTES
Chief Complaint   Patient presents with    Complete Physical       1. Have you been to the ER, urgent care clinic since your last visit? Hospitalized since your last visit? yes ER    2. Have you seen or consulted any other health care providers outside of the [address] [place] [address] since your last visit? Include any pap smears or colon screening.  no

## 2021-03-02 ENCOUNTER — HOSPITAL ENCOUNTER (OUTPATIENT)
Dept: GENERAL RADIOLOGY | Age: 46
Discharge: HOME | End: 2021-03-02
Payer: COMMERCIAL

## 2021-03-02 DIAGNOSIS — R06.02 SOB (SHORTNESS OF BREATH): ICD-10-CM

## 2021-03-02 PROCEDURE — 71046 X-RAY EXAM CHEST 2 VIEWS: CPT

## 2021-03-03 LAB
CHOLEST SERPL-MCNC: 209 MG/DL (ref 100–199)
ERYTHROCYTE [DISTWIDTH] IN BLOOD BY AUTOMATED COUNT: 14.2 % (ref 11.7–15.4)
HBA1C MFR BLD: 5.7 % (ref 4.8–5.6)
HCT VFR BLD AUTO: 38.1 % (ref 34–46.6)
HDLC SERPL-MCNC: 65 MG/DL
HGB BLD-MCNC: 12.3 G/DL (ref 11.1–15.9)
LDLC SERPL CALC-MCNC: 115 MG/DL (ref 0–99)
MCH RBC QN AUTO: 26.7 PG (ref 26.6–33)
MCHC RBC AUTO-ENTMCNC: 32.3 G/DL (ref 31.5–35.7)
MCV RBC AUTO: 83 FL (ref 79–97)
PLATELET # BLD AUTO: 303 X10E3/UL (ref 150–450)
RBC # BLD AUTO: 4.6 X10E6/UL (ref 3.77–5.28)
TRIGL SERPL-MCNC: 166 MG/DL (ref 0–149)
TSH SERPL DL<=0.005 MIU/L-ACNC: 1.32 UIU/ML (ref 0.45–4.5)
VLDLC SERPL CALC-MCNC: 29 MG/DL (ref 5–40)
WBC # BLD AUTO: 5.8 X10E3/UL (ref 3.4–10.8)

## 2021-03-04 ENCOUNTER — TELEPHONE (OUTPATIENT)
Dept: FAMILY MEDICINE CLINIC | Age: 46
End: 2021-03-04

## 2021-03-04 NOTE — TELEPHONE ENCOUNTER
----- Message from Jeffery Thomas sent at 3/4/2021  1:21 PM EST -----  Regarding: /Telephone      General Message/Vendor Calls    Caller's first and last name: Self       Reason for call: Contact info for EKG      Callback required yes/no and why: Yes to give contact information. Best contact number(s): [phone]      Details to clarify the request: Pt misplaced the information she needs to schedule for EKG.        Jeffery Thomas

## 2021-03-10 ENCOUNTER — VIRTUAL VISIT (OUTPATIENT)
Dept: FAMILY MEDICINE CLINIC | Age: 46
End: 2021-03-10
Payer: COMMERCIAL

## 2021-03-10 DIAGNOSIS — R06.02 SOB (SHORTNESS OF BREATH): Primary | ICD-10-CM

## 2021-03-10 DIAGNOSIS — R06.09 DOE (DYSPNEA ON EXERTION): ICD-10-CM

## 2021-03-10 PROCEDURE — 99214 OFFICE O/P EST MOD 30 MIN: CPT | Performed by: FAMILY MEDICINE

## 2021-03-10 RX ORDER — FLUTICASONE PROPIONATE AND SALMETEROL 250; 50 UG/1; UG/1
1 POWDER RESPIRATORY (INHALATION) EVERY 12 HOURS
Qty: 1 EACH | Refills: 1 | Status: SHIPPED | OUTPATIENT
Start: 2021-03-10 | End: 2022-02-15 | Stop reason: ALTCHOICE

## 2021-03-10 NOTE — PROGRESS NOTES
Chief Complaint   Patient presents with    Shortness of Breath     2 week check    1. Have you been to the ER, urgent care clinic since your last visit? Hospitalized since your last visit? no    2. Have you seen or consulted any other health care providers outside of the [address] [place] [address] since your last visit? Include any pap smears or colon screening.  no

## 2021-03-10 NOTE — PROGRESS NOTES
Consent: Aline Valadez, who was seen by synchronous (real-time) audio-video technology, and/or her healthcare decision maker, is aware that this patient-initiated, Telehealth encounter on 3/10/2021 is a billable service, with coverage as determined by her insurance carrier. She is aware that she may receive a bill and has provided verbal consent to proceed: Yes. Aline Valadez is a 39 y.o. female       has a past medical history of BMI 30.0-30.9,adult (3/17/2017), COVID-19 (5/28/2020), IBS (irritable bowel syndrome), IUD (intrauterine device) in place (3/17/2017), Pre-diabetes (4/19/2017), Stool color black, and Vitamin D deficiency (4/19/2017).     COVID 19 positive in 04/2020  Ever since then she's having some SOB with exertional activities. Denies association with CP or any CP. Currently denies CP. Her SPO2 % at our last visit  Never smokes. No previous hx of asthma or COPD    She didn't do her EKG  Labs reviewed with pt and all were normal.   CXR normal    We'll treat assuming pulm source. Albuterol prn and preemptive exertional activities week 1. Maintenance inhaler starting week 2. F/u 4 weeks to re-eval     Prediabetes      HLD      Reviewed: active problem list, medication list, allergies, notes from last encounter, lab results    A comprehensive review of systems was negative except for that written in the HPI. Assessment & Plan:   Diagnoses and all orders for this visit:    1. SOB (shortness of breath)  -     fluticasone propion-salmeteroL (ADVAIR/WIXELA) 250-50 mcg/dose diskus inhaler; Take 1 Puff by inhalation every twelve (12) hours. -     albuterol sulfate (PROAIR RESPICLICK) 90 mcg/actuation breath activated inhaler; Take 2 Puffs by inhalation every four (4) hours as needed for Wheezing. 2. MUIR (dyspnea on exertion)  -     fluticasone propion-salmeteroL (ADVAIR/WIXELA) 250-50 mcg/dose diskus inhaler; Take 1 Puff by inhalation every twelve (12) hours.   - albuterol sulfate (PROAIR RESPICLICK) 90 mcg/actuation breath activated inhaler; Take 2 Puffs by inhalation every four (4) hours as needed for Wheezing. Follow-up and Dispositions    · Return in about 1 month (around 4/10/2021) for SOB, MUIR. I spent at least 15 minutes with this established patient, and >50% of the time was spent counseling and/or coordinating care regarding SOB, MUIR  712  Subjective:   Fay Stark is a 39 y.o. female who was seen for Shortness of Breath      Prior to Admission medications    Medication Sig Start Date End Date Taking? Authorizing Provider   fluticasone propion-salmeteroL (ADVAIR/WIXELA) 250-50 mcg/dose diskus inhaler Take 1 Puff by inhalation every twelve (12) hours. 3/10/21  Yes Britt Jay MD   albuterol sulfate (PROAIR RESPICLICK) 90 mcg/actuation breath activated inhaler Take 2 Puffs by inhalation every four (4) hours as needed for Wheezing. 3/10/21  Yes Britt Jay MD   Omeprazole delayed release (PRILOSEC D/R) 20 mg tablet Take 1 Tab by mouth daily.  21   Britt Jay MD     Allergies   Allergen Reactions    Latex Rash    Banana Other (comments)     GERD    Kiwi Other (comments)     Itchy throat    Macrobid [Nitrofurantoin Monohyd/M-Cryst] Hives       Patient Active Problem List    Diagnosis Date Noted    COVID-19 2020    Hx of one miscarriage 2017    Hx of  2017    Enlarged uterus 2017    Pre-diabetes 2017    Vitamin D deficiency 2017    IUD (intrauterine device) in place 2017    Ganglion of tendon sheath 2013     Past Medical History:   Diagnosis Date    BMI 30.0-30.9,adult 3/17/2017    COVID-19 2020    IBS (irritable bowel syndrome)     IUD (intrauterine device) in place 3/17/2017    Pre-diabetes 2017    Stool color black     Vitamin D deficiency 2017     Past Surgical History:   Procedure Laterality Date    HX ORTHOPAEDIC      Lt wrist cyst removed Objective:   Vital Signs: (As obtained by patient/caregiver at home)  There were no vitals taken for this visit. Constitutional: [x] Appears well-developed and well-nourished [x] No apparent distress        Mental status: [x] Alert and awake  [x] Oriented to person/place/time [x] Able to follow commands      Eyes:   EOM    [x]  Normal      Sclera  [x]  Normal              Discharge [x]  None visible       HENT: [x] Normocephalic, atraumatic    [] Mouth/Throat: Mucous membranes are moist    External Ears [x] Normal      Neck: [x] No visualized mass     Pulmonary/Chest: [x] Respiratory effort normal   [x] No visualized signs of difficulty breathing or respiratory distress           Musculoskeletal:   [x] Normal gait with no signs of ataxia         [x] Normal range of motion of neck         Neurological:        [x] No Facial Asymmetry (Cranial nerve 7 motor function) (limited exam due to video visit)          [x] No gaze palsy              Skin:        [x] No significant exanthematous lesions or discoloration noted on facial skin                  Psychiatric:       [x] Normal Affect [] Abnormal -        [x] No Hallucinations      We discussed the expected course, resolution and complications of the diagnosis(es) in detail. Medication risks, benefits, costs, interactions, and alternatives were discussed as indicated. I advised her to contact the office if her condition worsens, changes or fails to improve as anticipated. She expressed understanding with the diagnosis(es) and plan. Mulugeta Contreras is a 39 y.o. female being evaluated by a video visit encounter for concerns as above. A caregiver was present when appropriate. Due to this being a TeleHealth encounter (During KNTPZ-26 public health emergency), evaluation of the following organ systems was limited: Vitals/Constitutional/EENT/Resp/CV/GI//MS/Neuro/Skin/Heme-Lymph-Imm.   Pursuant to the emergency declaration under the [address] and the National Emergencies Act, [facility] waiver authority and the [employer] and [employer] Act, this Virtual  Visit was conducted, with patient's (and/or legal guardian's) consent, to reduce the patient's risk of exposure to COVID-19 and provide necessary medical care. Services were provided through a video synchronous discussion virtually to substitute for in-person clinic visit. Patient and provider were located at their individual homes.         Deonna Escobar MD

## 2021-05-05 ENCOUNTER — TELEPHONE (OUTPATIENT)
Dept: FAMILY MEDICINE CLINIC | Age: 46
End: 2021-05-05

## 2021-05-05 NOTE — TELEPHONE ENCOUNTER
----- Message from Braeden Castaneda sent at 5/4/2021  4:57 PM EDT -----  Regarding: Dr. Arenas/ telephone  Patient return call    Caller's first and last name and relationship (if not the patient): N/A      Best contact number(s):[phone]      Whose call is being returned: unknown      Details to clarify the request: N/A      Braeden Castaneda

## 2021-08-06 ENCOUNTER — TELEPHONE (OUTPATIENT)
Dept: FAMILY MEDICINE CLINIC | Age: 46
End: 2021-08-06

## 2021-08-10 ENCOUNTER — VIRTUAL VISIT (OUTPATIENT)
Dept: FAMILY MEDICINE CLINIC | Age: 46
End: 2021-08-10
Payer: COMMERCIAL

## 2021-08-10 DIAGNOSIS — M25.552 HIP PAIN, LEFT: ICD-10-CM

## 2021-08-10 DIAGNOSIS — M54.42 ACUTE LEFT-SIDED LOW BACK PAIN WITH LEFT-SIDED SCIATICA: Primary | ICD-10-CM

## 2021-08-10 DIAGNOSIS — M25.562 PAIN AND SWELLING OF LEFT KNEE: ICD-10-CM

## 2021-08-10 DIAGNOSIS — M25.462 PAIN AND SWELLING OF LEFT KNEE: ICD-10-CM

## 2021-08-10 PROCEDURE — 99213 OFFICE O/P EST LOW 20 MIN: CPT | Performed by: FAMILY MEDICINE

## 2021-08-10 RX ORDER — PREDNISONE 10 MG/1
TABLET ORAL
Qty: 21 TABLET | Refills: 0 | Status: SHIPPED | OUTPATIENT
Start: 2021-08-10 | End: 2022-02-15 | Stop reason: ALTCHOICE

## 2021-08-10 NOTE — PROGRESS NOTES
Consent: Lise Hammond, who was seen by synchronous (real-time) audio-video technology, and/or her healthcare decision maker, is aware that this patient-initiated, Telehealth encounter on 8/10/2021 is a billable service, with coverage as determined by her insurance carrier. She is aware that she may receive a bill and has provided verbal consent to proceed: Yes. Lise Hammond is a 42 y.o. female    LMP: 08/03/21     has a past medical history of BMI 30.0-30.9,adult (3/17/2017), COVID-19 (5/28/2020), IBS (irritable bowel syndrome), IUD (intrauterine device) in place (3/17/2017), Pre-diabetes (4/19/2017), Stool color black, and Vitamin D deficiency (4/19/2017). Hx of covid  No longer SOB    Left knee, hip, lower back pain. Feels like can't put pressure on knee \"climbing in bed\". X 2 weeks. Denies injuries or trauma. Denies redness, just swelling of medial side of knee. No period of immobility. Difficult to turn knee, have to make a loop. Denies LE weakness, fever chills, saddle anesthesia. X-ray lumbar, hip and knee. Start prednisone      Reviewed: active problem list, medication list, allergies, notes from last encounter, lab results    A comprehensive review of systems was negative except for that written in the HPI. Assessment & Plan:   Diagnoses and all orders for this visit:    1. Acute left-sided low back pain with left-sided sciatica  -     predniSONE (STERAPRED DS) 10 mg dose pack; See administration instruction per 10mg dose pack  -     XR SPINE LUMB 2 OR 3 V; Future    2. Hip pain, left  -     predniSONE (STERAPRED DS) 10 mg dose pack; See administration instruction per 10mg dose pack  -     XR HIP LT W OR WO PELV 2-3 VWS; Future    3.  Pain and swelling of left knee  -     predniSONE (STERAPRED DS) 10 mg dose pack; See administration instruction per 10mg dose pack  -     XR KNEE LT 3 V; Future      Follow-up and Dispositions    · Return in about 3 weeks (around 8/31/2021) for in office apt, knee, hip and lower back pain. 712  Subjective:   Juan Copeland is a 42 y.o. female who was seen for Knee Pain (left knee pain & swelling for over a week)      Prior to Admission medications    Medication Sig Start Date End Date Taking? Authorizing Provider   aspirin/caffeine (BC PAIN RELIEF PO) Take  by mouth. Yes Provider, Historical   predniSONE (STERAPRED DS) 10 mg dose pack See administration instruction per 10mg dose pack 8/10/21  Yes Dante Arenas MD   fluticasone propion-salmeteroL (ADVAIR/WIXELA) 250-50 mcg/dose diskus inhaler Take 1 Puff by inhalation every twelve (12) hours. Patient not taking: Reported on 8/10/2021 3/10/21   Philippe Mae MD   albuterol sulfate (PROAIR RESPICLICK) 90 mcg/actuation breath activated inhaler Take 2 Puffs by inhalation every four (4) hours as needed for Wheezing. Patient not taking: Reported on 8/10/2021 3/10/21   Philippe Mae MD   Omeprazole delayed release (PRILOSEC D/R) 20 mg tablet Take 1 Tab by mouth daily. Patient not taking: Reported on 8/10/2021 2/2/21   Philippe Mae MD     Allergies   Allergen Reactions    Latex Rash    Banana Other (comments)     GERD    Kiwi Other (comments)     Itchy throat    Macrobid [Nitrofurantoin Monohyd/M-Cryst] Hives         Objective:   Vital Signs: (As obtained by patient/caregiver at home)  There were no vitals taken for this visit.      Constitutional: [x] Appears well-developed and well-nourished [x] No apparent distress        Mental status: [x] Alert and awake  [x] Oriented to person/place/time [x] Able to follow commands      Eyes:   EOM    [x]  Normal      Sclera  [x]  Normal              Discharge [x]  None visible       HENT: [x] Normocephalic, atraumatic    [] Mouth/Throat: Mucous membranes are moist    External Ears [x] Normal      Neck: [x] No visualized mass     Pulmonary/Chest: [x] Respiratory effort normal   [x] No visualized signs of difficulty breathing or respiratory distress           Musculoskeletal:   [x] Normal gait with no signs of ataxia         [x] Normal range of motion of neck  Normal ROM of waist, light pain on twisting. Able to do squat. Left knee slight more swelling compared to right. No redness. Calves are equal bilat. LE strength equal against gravity           Neurological:        [x] No Facial Asymmetry (Cranial nerve 7 motor function) (limited exam due to video visit)          [x] No gaze palsy              Skin:        [x] No significant exanthematous lesions or discoloration noted on facial skin                  Psychiatric:       [x] Normal Affect [] Abnormal -        [x] No Hallucinations      We discussed the expected course, resolution and complications of the diagnosis(es) in detail. Medication risks, benefits, costs, interactions, and alternatives were discussed as indicated. I advised her to contact the office if her condition worsens, changes or fails to improve as anticipated. She expressed understanding with the diagnosis(es) and plan. Mira Vasquez is a 42 y.o. female being evaluated by a video visit encounter for concerns as above. A caregiver was present when appropriate. Due to this being a TeleHealth encounter (During [facility] public health emergency), evaluation of the following organ systems was limited: Vitals/Constitutional/EENT/Resp/CV/GI//MS/Neuro/Skin/Heme-Lymph-Imm. Pursuant to the emergency declaration under the [facility], [facility] waiver authority and the [employer] and [employer] Act, this Virtual  Visit was conducted, with patient's (and/or legal guardian's) consent, to reduce the patient's risk of exposure to COVID-19 and provide necessary medical care. Services were provided through a video synchronous discussion virtually to substitute for in-person clinic visit.    Patient and provider were located at their individual homes.         Rick Andrews MD

## 2021-08-10 NOTE — PROGRESS NOTES
Chief Complaint   Patient presents with    Knee Pain     left knee pain & swelling for over a week       1. Have you been to the ER, urgent care clinic since your last visit? Hospitalized since your last visit? No     2. Have you seen or consulted any other health care providers outside of the [address] [place] [address] since your last visit? Include any pap smears or colon screening.  No

## 2021-08-11 ENCOUNTER — TELEPHONE (OUTPATIENT)
Dept: FAMILY MEDICINE CLINIC | Age: 46
End: 2021-08-11

## 2021-08-11 NOTE — TELEPHONE ENCOUNTER
----- Message from Sima Murry sent at 8/11/2021  4:02 PM EDT -----  Regarding: Dagoberto Maloney/Telephone  General Message/Vendor Calls    Caller's first and last name:  N/A      Reason for call:  Appointment WORK -IN      Callback required yes/no and why: yes      Best contact number(s):[phone]      Details to clarify the request:Dr Arenas  would like to see the patient in 2 weeks for follow-up of xray.       Sima Murry

## 2021-08-12 NOTE — TELEPHONE ENCOUNTER
Spoke with patient. Scheduled appt for 9/8/21. Patient said that her insurance runs out 8/31/21 & she wanted to be seen before that.  She wants to be worked in for follow up on x-ray results

## 2021-08-13 ENCOUNTER — HOSPITAL ENCOUNTER (OUTPATIENT)
Dept: GENERAL RADIOLOGY | Age: 46
Discharge: HOME | End: 2021-08-13
Payer: COMMERCIAL

## 2021-08-13 DIAGNOSIS — M25.552 HIP PAIN, LEFT: ICD-10-CM

## 2021-08-13 DIAGNOSIS — M25.562 PAIN AND SWELLING OF LEFT KNEE: ICD-10-CM

## 2021-08-13 DIAGNOSIS — M25.462 PAIN AND SWELLING OF LEFT KNEE: ICD-10-CM

## 2021-08-13 DIAGNOSIS — M54.42 ACUTE LEFT-SIDED LOW BACK PAIN WITH LEFT-SIDED SCIATICA: ICD-10-CM

## 2021-08-13 PROCEDURE — 73562 X-RAY EXAM OF KNEE 3: CPT

## 2021-08-13 PROCEDURE — 73502 X-RAY EXAM HIP UNI 2-3 VIEWS: CPT

## 2021-08-13 PROCEDURE — 72100 X-RAY EXAM L-S SPINE 2/3 VWS: CPT

## 2021-08-13 NOTE — TELEPHONE ENCOUNTER
Message  Received: MD Karl Mora LPN  Caller: Unspecified (2 days ago,  4:08 PM)  Can you fit her in before her insurance runs out. Virtual apt ok     Randee Fernández MD   8/12/2021     Spoke with patient above message given. She said that she would call back. She is going to check with HR to see if insurance will go a little longer.

## 2021-08-16 ENCOUNTER — OFFICE VISIT (OUTPATIENT)
Dept: FAMILY MEDICINE CLINIC | Age: 46
End: 2021-08-16
Payer: COMMERCIAL

## 2021-08-16 VITALS
RESPIRATION RATE: 16 BRPM | OXYGEN SATURATION: 99 % | DIASTOLIC BLOOD PRESSURE: 60 MMHG | HEART RATE: 73 BPM | WEIGHT: 175.8 LBS | SYSTOLIC BLOOD PRESSURE: 111 MMHG | BODY MASS INDEX: 32.35 KG/M2 | HEIGHT: 62 IN

## 2021-08-16 DIAGNOSIS — R73.03 PREDIABETES: ICD-10-CM

## 2021-08-16 DIAGNOSIS — M25.50 CHRONIC PAIN OF MULTIPLE JOINTS: ICD-10-CM

## 2021-08-16 DIAGNOSIS — E78.00 HIGH CHOLESTEROL: ICD-10-CM

## 2021-08-16 DIAGNOSIS — G89.29 CHRONIC PAIN OF MULTIPLE JOINTS: ICD-10-CM

## 2021-08-16 DIAGNOSIS — Z00.00 ANNUAL PHYSICAL EXAM: Primary | ICD-10-CM

## 2021-08-16 DIAGNOSIS — Z79.899 ENCOUNTER FOR LONG-TERM (CURRENT) USE OF MEDICATIONS: ICD-10-CM

## 2021-08-16 PROCEDURE — 99396 PREV VISIT EST AGE 40-64: CPT | Performed by: FAMILY MEDICINE

## 2021-08-16 PROCEDURE — 99213 OFFICE O/P EST LOW 20 MIN: CPT | Performed by: FAMILY MEDICINE

## 2021-08-16 NOTE — LETTER
8/16/2021    Ms. [address] [place] [address] [address]      Dear Colin Ledbetter:    Please find your most recent results below. Resulted Orders   XR HIP LT W OR WO PELV 2-3 VWS    Narrative    EXAM: XR HIP LT W OR WO PELV 2-3 VWS    INDICATION: . Left hip pain    COMPARISON: None. FINDINGS: AP view of the pelvis and a frogleg lateral view of the left hip  demonstrate no fracture, dislocation or other acute abnormality. Impression    No acute abnormality.        Sincerely,      Charmayne App, MD

## 2021-08-16 NOTE — PROGRESS NOTES
Christen Barboza is a 42 y.o. female seen for annual exam    Patient's last menstrual period was 08/01/2021 (approximate). has a past medical history of BMI 30.0-30.9,adult (3/17/2017), COVID-19 (5/28/2020), IBS (irritable bowel syndrome), IUD (intrauterine device) in place (3/17/2017), Pre-diabetes (4/19/2017), Stool color black, and Vitamin D deficiency (4/19/2017). Hx of covid  No longer SOB  Not vaccinated for COVID    Last visit was having back, hip and knee left pain. Xray lumbar, hip and knee done and treated with prednisone taper course. Xrays reviewed with pt. LUmbar mild DJD  She just picked up the prednisone today, haven't taken it yet. Multiple joint pain as above and;  Right handed. The entire left arm from shoulder down to wrist aches  And right wrist aching. All the time joints ache. No morning or evengin. Also have neck pain    Prediabetes A1C 5.7% 03/2021,     HLD mixed, mild 3/2021,       Social History: single partner, contraception - none. OBGYN: does papsmear there    evaluation and treatment of left knee pain. This is evaluated as a personal injury. The pain began 1 month ago. Injury history: no, this just started on own. The pain is located medial. Symptoms improve with rest. Symptoms worsen with activity, kneeling. The knee has not given out or felt unstable. The patient can bend and straighten the knee fully. Treatment to date has included NSAID's, without significant relief. Patients activity level: work activities, but no sports activities    A comprehensive review of systems was negative except for that written in the HPI. Current Outpatient Medications   Medication Sig Dispense Refill    predniSONE (STERAPRED DS) 10 mg dose pack See administration instruction per 10mg dose pack 21 Tablet 0    aspirin/caffeine (BC PAIN RELIEF PO) Take  by mouth.  (Patient not taking: Reported on 8/16/2021)      fluticasone propion-salmeteroL (ADVAIR/WIXELA) 250-50 mcg/dose diskus inhaler Take 1 Puff by inhalation every twelve (12) hours. (Patient not taking: Reported on 8/10/2021) 1 Each 1    albuterol sulfate (PROAIR RESPICLICK) 90 mcg/actuation breath activated inhaler Take 2 Puffs by inhalation every four (4) hours as needed for Wheezing. (Patient not taking: Reported on 8/10/2021) 1 Inhaler 1    Omeprazole delayed release (PRILOSEC D/R) 20 mg tablet Take 1 Tab by mouth daily. (Patient not taking: Reported on 8/10/2021) 30 Tab 1     Allergies   Allergen Reactions    Latex Rash    Banana Other (comments)     GERD    Kiwi Other (comments)     Itchy throat    Macrobid [Nitrofurantoin Monohyd/M-Cryst] Hives     Past Medical History:   Diagnosis Date    BMI 30.0-30.9,adult 3/17/2017    COVID-19 5/28/2020    IBS (irritable bowel syndrome)     IUD (intrauterine device) in place 3/17/2017    Pre-diabetes 4/19/2017    Stool color black     Vitamin D deficiency 4/19/2017     Past Surgical History:   Procedure Laterality Date    HX ORTHOPAEDIC      Lt wrist cyst removed        Objective:      Visit Vitals  /60   Pulse 73   Resp 16   Ht 5' 2\" (1.575 m)   Wt 175 lb 12.8 oz (79.7 kg)   SpO2 99%   BMI 32.15 kg/m²     General appearance: alert, cooperative, no distress, appears stated age  Neurologic: Alert and oriented X 3, normal strength and tone, symmetric. Normal without focal findings. Cranial nerves 2-12 intact. Normal coordination and gait. Mental status: Alert, oriented, thought content appropriate, affect: stable, mood-congruent. Head: Normocephalic, without obvious abnormality, atraumatic  Eyes: conjunctivae/corneas clear. PERRL, EOM's intact. Neck: supple, symmetrical, trachea midline, no JVD  Lungs: clear to auscultation bilaterally  Heart: regular rate and rhythm, S1, S2 normal, no murmur, click, rub or gallop  Abdomen: soft, non-tender.    Extremities: extremities normal, atraumatic, no cyanosis or edema    Gait: Normal. The patient can bear weight on the injured extremity. Left Lower Extremity  Hip Palpation:  no tenderness over the greater  trochanter   Hip ROM: 90% of normal   Hamstring tightness reveals a popliteal angle to be 35 degrees   Knee Effusion:  None. Ecchymosis:  none   Knee ROM:  -5 to 135 degrees without subpatellar crepitance. Patella:  Patella does track normally. Patellar apprehension test: positive  Patellar compression test: negative   Tenderness: medial joint line   Stability:  Lachman's test: negative  Posterior drawer: negative  Medial collateral ligament: minimal  Lateral collateral ligament: negative     Caitlin Test:  negative   Deonte's Test:  negative with no joint line tenderness   Sensation:   intact to light touch   Pulses: normal DP and PT pulses     Imaging  Xrays: 4 views of the knee demonstrate no remarkable findings. Assessment:     Diagnoses and all orders for this visit:    1. Annual physical exam  -     METABOLIC PANEL, COMPREHENSIVE; Future  -     HEMOGLOBIN A1C WITH EAG; Future    2. Chronic pain of multiple joints  -     RHEUMATOID FACTOR, QT; Future  -     CYCLIC CITRUL PEPTIDE AB, IGG; Future  -     LUPUS ANTICOAGULANT PANEL W/ REFLEX; Future  -     CRP, HIGH SENSITIVITY; Future    3. High cholesterol  -     METABOLIC PANEL, COMPREHENSIVE; Future    4. Prediabetes  -     HEMOGLOBIN A1C WITH EAG; Future    5. Encounter for long-term (current) use of medications  -     RHEUMATOID FACTOR, QT; Future  -     CYCLIC CITRUL PEPTIDE AB, IGG; Future  -     LUPUS ANTICOAGULANT PANEL W/ REFLEX; Future  -     METABOLIC PANEL, COMPREHENSIVE; Future  -     CRP, HIGH SENSITIVITY; Future  -     HEMOGLOBIN A1C WITH EAG; Future      Follow-up and Dispositions    · Return in about 6 months (around 2/16/2022) for prediabetes, sooner if pain not better or if labs abnormal, or 2 weeks f/up labs.          Kassandra Reyes MD  8/16/2021

## 2021-08-16 NOTE — LETTER
8/16/2021    Ms. [address] [place] [address] [address]      Dear Benjie Smith:    Please find your most recent results below. Resulted Orders   XR SPINE LUMB 2 OR 3 V    Narrative    INDICATION: Low back pain with sciatica left side. EXAM: Lumbar spine radiographs, 3 views. COMPARISON:  None . FINDINGS: A three-view examination of the lumbar spine reveals normal bony  alignment. Bone mineral content is normal for age . There is no obvious acute  fracture or dislocation. Vertebral body heights are preserved. Disc space height  is diminished at L3-4 with endplate sclerosis and spondylosis. .  Pedicles and  sacroiliac joints are intact, as are visualized sacral foramina. Impression    No acute bony abnormality of the lumbar spine. Mild degenerative  change.          Sincerely,      Cecil Garcia MD

## 2021-08-16 NOTE — PROGRESS NOTES
Chief Complaint   Patient presents with    Complete Physical     sees GYN       1. Have you been to the ER, urgent care clinic since your last visit? Hospitalized since your last visit? No     2. Have you seen or consulted any other health care providers outside of the [address] [place] [address] since your last visit? Include any pap smears or colon screening.  No

## 2021-08-16 NOTE — LETTER
8/16/2021    Ms. [address] [place] [address] [address]      Dear Keon Thayer:    Please find your most recent results below. XR KNEE LT 3 V    Narrative    EXAM: XR KNEE LT 3 V    INDICATION: Pain and swelling of left knee. COMPARISON: None. FINDINGS: Three views of the left knee demonstrate no fracture or other acute  osseous or articular abnormality. There is no effusion. Impression    No acute abnormality.        Sincerely,      Zee Nieves MD

## 2021-09-16 ENCOUNTER — TELEPHONE (OUTPATIENT)
Dept: FAMILY MEDICINE CLINIC | Age: 46
End: 2021-09-16

## 2021-09-16 NOTE — TELEPHONE ENCOUNTER
Patient is calling, because she missed her appt last week. Can doctor just call her with her blood work results. Please call [phone].

## 2021-09-17 NOTE — TELEPHONE ENCOUNTER
MD Rick Ruggiero LPN  Caller: Unspecified Derrel Eriberto, 10:55 AM)  Labs printed to mail home. Kassandra Reyes MD   9/16/2021     Spoke with patient. Results given & letter mailed.  Patient gave new address  [address] [place], [address] [place] [address]

## 2022-02-15 ENCOUNTER — VIRTUAL VISIT (OUTPATIENT)
Dept: FAMILY MEDICINE CLINIC | Age: 47
End: 2022-02-15
Payer: COMMERCIAL

## 2022-02-15 DIAGNOSIS — Z79.899 ENCOUNTER FOR LONG-TERM (CURRENT) USE OF MEDICATIONS: ICD-10-CM

## 2022-02-15 DIAGNOSIS — Z28.21 COVID-19 VACCINATION REFUSED: ICD-10-CM

## 2022-02-15 DIAGNOSIS — R73.03 PREDIABETES: ICD-10-CM

## 2022-02-15 DIAGNOSIS — K21.9 GASTROESOPHAGEAL REFLUX DISEASE WITHOUT ESOPHAGITIS: Primary | ICD-10-CM

## 2022-02-15 DIAGNOSIS — E78.00 HIGH CHOLESTEROL: ICD-10-CM

## 2022-02-15 PROBLEM — U07.1 COVID-19: Status: RESOLVED | Noted: 2020-05-28 | Resolved: 2022-02-15

## 2022-02-15 PROBLEM — Z86.16 HISTORY OF COVID-19: Chronic | Status: ACTIVE | Noted: 2022-02-15

## 2022-02-15 PROCEDURE — 99213 OFFICE O/P EST LOW 20 MIN: CPT | Performed by: FAMILY MEDICINE

## 2022-02-15 RX ORDER — PHENOL/SODIUM PHENOLATE
20 AEROSOL, SPRAY (ML) MUCOUS MEMBRANE DAILY
Qty: 30 TABLET | Refills: 1 | Status: SHIPPED | OUTPATIENT
Start: 2022-02-15

## 2022-02-15 NOTE — PROGRESS NOTES
Consent: Alban Hopson, who was seen by synchronous (real-time) audio-video technology, and/or her healthcare decision maker, is aware that this patient-initiated, Telehealth encounter on 2/15/2022 is a billable service, with coverage as determined by her insurance carrier. She is aware that she may receive a bill and has provided verbal consent to proceed: Yes. Alban Hopson is a 42 y.o. female,     She have moved to [facility], will look for a new doctor there eventually. For now she would like me to monitor her a1c and lipid    IUD    Hx of covid  Refuses COVID vaccine     has a past medical history of COVID-19 (5/28/2020), COVID-19 vaccination refused (2/15/2022), History of COVID-19 (2/15/2022), IBS (irritable bowel syndrome), IUD (intrauterine device) in place (3/17/2017), Pre-diabetes (4/19/2017), and Vitamin D deficiency (4/19/2017). Prediabetes A1C 5.7% 03/2021,      HLD mixed, mild 3/2021,     GERD: needs refill omeprazole      Reviewed: active problem list, medication list, allergies, notes from last encounter, lab results    A comprehensive review of systems was negative except for that written in the HPI. Assessment & Plan:   Diagnoses and all orders for this visit:    1. Gastroesophageal reflux disease without esophagitis  -     Omeprazole delayed release (PRILOSEC D/R) 20 mg tablet; Take 1 Tablet by mouth daily. 2. High cholesterol  -     METABOLIC PANEL, BASIC; Future  -     HEPATIC FUNCTION PANEL; Future  -     LIPID PANEL; Future  -     TSH 3RD GENERATION; Future    3. Prediabetes  -     HEMOGLOBIN A1C WITH EAG; Future    4. COVID-19 vaccination refused    5. Encounter for long-term (current) use of medications  -     METABOLIC PANEL, BASIC; Future  -     HEPATIC FUNCTION PANEL; Future  -     LIPID PANEL; Future  -     TSH 3RD GENERATION; Future  -     HEMOGLOBIN A1C WITH EAG;  Future      Follow-up and Dispositions    · Return in about 6 months (around 8/15/2022) for annual exam, sooner as needed or if labs abnormal.       712  Subjective:   Edgar Borden is a 42 y.o. female who was seen for Cholesterol Problem      Prior to Admission medications    Medication Sig Start Date End Date Taking? Authorizing Provider   Omeprazole delayed release (PRILOSEC D/R) 20 mg tablet Take 1 Tablet by mouth daily. 2/15/22  Yes Pilar Rowan MD     Allergies   Allergen Reactions    Latex Rash    Banana Other (comments)     GERD    Kiwi Other (comments)     Itchy throat    Macrobid [Nitrofurantoin Monohyd/M-Cryst] Hives         Objective:   Vital Signs: (As obtained by patient/caregiver at home)  There were no vitals taken for this visit. Constitutional: [x] Appears well-developed and well-nourished [x] No apparent distress        Mental status: [x] Alert and awake  [x] Oriented to person/place/time [x] Able to follow commands      Eyes:   EOM    [x]  Normal      Sclera  [x]  Normal              Discharge [x]  None visible       HENT: [x] Normocephalic, atraumatic    [] Mouth/Throat: Mucous membranes are moist    External Ears [x] Normal      Neck: [x] No visualized mass     Pulmonary/Chest: [x] Respiratory effort normal   [x] No visualized signs of difficulty breathing or respiratory distress           Musculoskeletal:   [x] Normal gait with no signs of ataxia         [x] Normal range of motion of neck         Neurological:        [x] No Facial Asymmetry (Cranial nerve 7 motor function) (limited exam due to video visit)          [x] No gaze palsy              Skin:        [x] No significant exanthematous lesions or discoloration noted on facial skin                  Psychiatric:       [x] Normal Affect [] Abnormal -        [x] No Hallucinations      We discussed the expected course, resolution and complications of the diagnosis(es) in detail. Medication risks, benefits, costs, interactions, and alternatives were discussed as indicated.   I advised her to contact the office if her condition worsens, changes or fails to improve as anticipated. She expressed understanding with the diagnosis(es) and plan. Gay Dong is a 42 y.o. female being evaluated by a video visit encounter for concerns as above. A caregiver was present when appropriate. Due to this being a TeleHealth encounter (During WWGAG-99 public health emergency), evaluation of the following organ systems was limited: Vitals/Constitutional/EENT/Resp/CV/GI//MS/Neuro/Skin/Heme-Lymph-Imm. Pursuant to the emergency declaration under the [facility], [facility] waiver authority and the [employer] and [employer] Act, this Virtual  Visit was conducted, with patient's (and/or legal guardian's) consent, to reduce the patient's risk of exposure to COVID-19 and provide necessary medical care. Services were provided through a video synchronous discussion virtually to substitute for in-person clinic visit. Patient and provider were located at their individual homes.         Juan Canseco MD

## 2022-02-15 NOTE — PROGRESS NOTES
Chief Complaint   Patient presents with    Cholesterol Problem     6 mo check    1. Have you been to the ER, urgent care clinic since your last visit? Hospitalized since your last visit? No     2. Have you seen or consulted any other health care providers outside of the [address] [place] [address] since your last visit? Include any pap smears or colon screening.  No

## 2022-03-18 PROBLEM — E55.9 VITAMIN D DEFICIENCY: Status: ACTIVE | Noted: 2017-04-19

## 2022-03-18 PROBLEM — R73.03 PRE-DIABETES: Status: ACTIVE | Noted: 2017-04-19

## 2022-03-19 PROBLEM — Z87.59 HX OF ONE MISCARRIAGE: Status: ACTIVE | Noted: 2017-06-26

## 2022-03-19 PROBLEM — N85.2 ENLARGED UTERUS: Status: ACTIVE | Noted: 2017-06-26

## 2022-03-19 PROBLEM — Z28.21 COVID-19 VACCINATION REFUSED: Status: ACTIVE | Noted: 2022-02-15

## 2022-03-19 PROBLEM — Z97.5 IUD (INTRAUTERINE DEVICE) IN PLACE: Status: ACTIVE | Noted: 2017-03-17

## 2022-03-20 PROBLEM — Z87.59 HX OF ABORTION: Status: ACTIVE | Noted: 2017-06-26

## 2022-03-20 PROBLEM — Z86.16 HISTORY OF COVID-19: Status: ACTIVE | Noted: 2022-02-15

## 2022-11-10 ENCOUNTER — HOSPITAL ENCOUNTER (OUTPATIENT)
Dept: PHYSICAL THERAPY | Age: 47
Discharge: HOME | End: 2022-11-10
Payer: COMMERCIAL

## 2022-11-10 PROCEDURE — 97162 PT EVAL MOD COMPLEX 30 MIN: CPT

## 2022-11-10 NOTE — PROGRESS NOTES
PT DAILY TREATMENT NOTE     Patient Name: Dc Cisneros  DBLI:  : 1975  [x]  Patient  Verified  Payor: Sabino Saab / [place]: [place] / Product Type: PPO /    In time:5:05  Out time:5:37  Total Treatment Time (min): 32  Visit #: 1 of     Medicare/[facility] Only   Total Timed Codes (min):  5 1:1 Treatment Time:  32       Treatment Area: Neck pain [M54.2]    SUBJECTIVE  Pain Level (0-10 scale): 6  Any medication changes, allergies to medications, adverse drug reactions, diagnosis change, or new procedure performed?: [x] No    [] Yes (see summary sheet for update)  Subjective functional status/changes:   [] No changes reported  Pt initial eval see POC for details    OBJECTIVE    27 min [x]Eval                  []Re-Eval       5 min Therapeutic Exercise:  [] See flow sheet :   Rationale: increase ROM and increase strength to improve the patient's ability to turn her head to drive              With   [] TE   [] TA   [] neuro   [] other: Patient Education: [x] Review HEP    [] Progressed/Changed HEP based on:   [] positioning   [] body mechanics   [] transfers   [] heat/ice application    [] other:      Other Objective/Functional Measures:    Justification for Eval Code Complexity:  Patient History : see POC   Examination see exam   Clinical Presentation: evolving  Clinical Decision Making : FOTO : 49/100    Pain Level (0-10 scale) post treatment: 6    ASSESSMENT/Changes in Function: Pt was instructed in initial HEP required demo, vc, and tc for all exercises to perform correctly. Pt was given hand out detailing exercises and instructed in modification of exercises to tolerance, and in performing exercises safely. Pt verbalized understanding.         Patient will continue to benefit from skilled PT services to modify and progress therapeutic interventions, address functional mobility deficits, address ROM deficits, address strength deficits, analyze and address soft tissue restrictions, analyze and cue movement patterns, analyze and modify body mechanics/ergonomics, assess and modify postural abnormalities, address imbalance/dizziness and instruct in home and community integration to attain remaining goals.      []  See Plan of Care  []  See progress note/recertification  []  See Discharge Summary         Progress towards goals / Updated goals:  No progress as goals were set today    PLAN  []  Upgrade activities as tolerated     []  Continue plan of care  []  Update interventions per flow sheet       []  Discharge due to:_  []  Other:_      [place] 11/10/2022  9:41 AM    Future Appointments   Date Time Provider Nadine Monaco   11/10/2022  5:00 PM Barton Likens [facility]
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
## 2022-11-29 ENCOUNTER — APPOINTMENT (OUTPATIENT)
Dept: PHYSICAL THERAPY | Age: 47
End: 2022-11-29
Payer: COMMERCIAL

## 2022-12-05 ENCOUNTER — HOSPITAL ENCOUNTER (OUTPATIENT)
Dept: PHYSICAL THERAPY | Age: 47
Discharge: HOME | End: 2022-12-05
Payer: COMMERCIAL

## 2022-12-05 PROCEDURE — 97530 THERAPEUTIC ACTIVITIES: CPT

## 2022-12-05 PROCEDURE — 97140 MANUAL THERAPY 1/> REGIONS: CPT

## 2022-12-05 PROCEDURE — 97110 THERAPEUTIC EXERCISES: CPT

## 2022-12-05 NOTE — PROGRESS NOTES
PHYSICAL THERAPY - DAILY TREATMENT NOTE    Patient Name: Jamison Pleitez        Date: 2022  : 1975   yes Patient  Verified  Visit #:   2   of     Insurance: Payor: Helga Ma / Plan: [place] [employer] / Product Type: PPO /      In time: 5:35 Out time: 6:39   Total Treatment Time: 64     Medicare/[facility] Time Tracking (below)   Total Timed Codes (min):   1:1 Treatment Time:       TREATMENT AREA =  Neck pain [M54.2]    SUBJECTIVE  Pain Level (on 0 to 10 scale):  3  / 10   Medication Changes/New allergies or changes in medical history, any new surgeries or procedures?    no  If yes, update Summary List   Subjective Functional Status/Changes:  []  No changes reported     I have been feeling most of the pain and tension in the left side of my neck going down to my shoulder but sometimes I feel it off to the right as well depending on the day and what I am doing.            OBJECTIVE  Modalities Rationale:     decrease pain and increase tissue extensibility to improve patient's ability to improve functional abilities    min [] Estim, type/location:                                      []  att     []  unatt     []  w/US     []  w/ice    []  w/heat    min []  Mechanical Traction: type/lbs                   []  pro   []  sup   []  int   []  cont    []  before manual    []  after manual    min []  Ultrasound, settings/location:      min []  Iontophoresis w/ dexamethasone, location:                                               []  take home patch       []  in clinic   10 min []  Ice     [x]  Heat    location/position: Cervical/seated     min []  Vasopneumatic Device, press/temp:    If using vaso (only need to measure limb vaso being performed on)      pre-treatment girth :       post-treatment girth :       measured at (landmark location) :    Vasopnuematic compression justification:  Per bilateral girth measures taken and listed above the edema is considered significant and having an impact on the patient's min []  Other:    [x] Skin assessment post-treatment (if applicable):    [x]  intact    [x]  redness- no adverse reaction                  []redness - adverse reaction:      34 min Therapeutic Exercise:  see flow sheet   Rationale: increase ROM, increase strength, improve coordination, improve balance, and increase proprioception to improve the patients ability to progress to functional activities limited by pain or other dysfunction     10 min Therapeutic Activity:  see flow sheet   Rationale: to improve functional activities, model real life movements and performance specific to the patients need with supervision to return the patient to their PLOF      10 min Manual Therapy: STM/tissue mobs to L>R cervical musculature in sitting    Rationale: decrease pain, increase ROM, increase tissue extensibility, decrease trigger points, and increase postural awareness to improve functional mobility   The manual therapy interventions were performed at a separate and distinct time from the therapeutic activities interventions    Billed With/As:   [] TE   [] TA   [] Neuro   [] Self Care Patient Education: [] Review HEP    [] Progressed/Changed HEP based on:   [] positioning   [] body mechanics   [] transfers   [] heat/ice application    [] other:      Other Objective/Functional Measures:  Verbal cueing/demonstration for proper form/technique with various exercises/activities during treatment. Reviewed POC/etiology of symptoms       Post Treatment Pain Level (on 0 to 10) scale:   3-4  / 10     ASSESSMENT  Assessment/Changes in Function:   Pt tolerated all new therex fairly well upon trial today with chief c/o increased L>R cervical pain that radiates down to L lateral shoulder. Pt also presents with signs of cervical instability as evidenced by symptoms that switch sides intermittently depending on activity level.  Pt did present with the most palpable tension/congestion in L>R cervical musculature with manual intervention today. Pt needs the most focus on cervical mobility and  postural strengthening with progression to cervicothoracic stabilization as tolerated. Will continue to progress/advance within current POC with monitoring symptoms as tolerated. []  See Progress Note/Recertification   Patient will continue to benefit from skilled PT services to modify and progress therapeutic interventions, address functional mobility deficits, address ROM deficits, address strength deficits, analyze and address soft tissue restrictions, analyze and cue movement patterns, analyze and modify body mechanics/ergonomics, assess and modify postural abnormalities, and instruct in home and community integration to attain remaining goals. Progress toward goals / Updated goals:  Short Term Goals: To be accomplished in 1 weeks:  1)  Patient will be independent and compliant with HEP in order to progress toward long term goals. Status at last note/certification: issued and reviewed 12/5/22: Met, Pt reports independence and compliance with current HEP multiple times a day since initial evaluation  Long Term Goals: To be accomplished in 8-12 treatments:  1) Patient will improve FOTO assessment score to 67 pts in order to indicate improved functional abilities. Status at last note/certification: 49 pts  2) Patient will improve cervical rotation to 65 deg or better without pain bilaterally to drive safely  Status at last note/certification: rotate R 53 deg p! rotate L 65 deg p! 3)Patient will report worst shoulder pain as 2/10 or less in order to progress toward personal goals. Status at last note/certification: 7/34  4)Patient will report overall at least 75% improvement in function in order to progress toward premorbid status.   Status at last note/certification: n/a  5) Patient will report headaches 1x per week or less for improved QOL  Status at last note/certification: daily headaches      PLAN  [x]  Upgrade activities as tolerated yes Continue plan of care   []  Discharge due to :    []  Other:      Therapist: Juhi Moncada PTA    Date: 12/5/2022 Time: 5:37 PM     Future Appointments   Date Time Provider Nadine Monaco   12/8/2022  6:00 PM Trinye Notice [facility]   12/12/2022  5:30 PM Darreld Donley, PTA [facility]   12/14/2022  6:00 PM Darreld Germán, PTA [facility]   12/19/2022  5:30 PM Darreld Donley, PTA [facility]   12/21/2022  5:15 PM Darreld Donley, PTA [id number] [facility]   12/28/2022  6:00 PM Corry Griffin, PT [facility]

## 2022-12-08 ENCOUNTER — HOSPITAL ENCOUNTER (OUTPATIENT)
Dept: PHYSICAL THERAPY | Age: 47
End: 2022-12-08
Payer: COMMERCIAL

## 2022-12-12 ENCOUNTER — HOSPITAL ENCOUNTER (OUTPATIENT)
Dept: PHYSICAL THERAPY | Age: 47
Discharge: HOME | End: 2022-12-12
Payer: COMMERCIAL

## 2022-12-12 PROCEDURE — 97530 THERAPEUTIC ACTIVITIES: CPT

## 2022-12-12 PROCEDURE — 97110 THERAPEUTIC EXERCISES: CPT

## 2022-12-12 NOTE — PROGRESS NOTES
PHYSICAL THERAPY - DAILY TREATMENT NOTE    Patient Name: Beka Villatoro        Date: 2022  : 1975   yes Patient  Verified  Visit #:   3   of     Insurance: Payor: Nadege Erp / Plan: [place] [employer] [place] / Product Type: PPO /      In time: 5:30 Out time: 6:30   Total Treatment Time: 60     Medicare/[facility] Time Tracking (below)   Total Timed Codes (min):   1:1 Treatment Time:       TREATMENT AREA =  Neck pain [M54.2]    SUBJECTIVE  Pain Level (on 0 to 10 scale):  8  / 10   Medication Changes/New allergies or changes in medical history, any new surgeries or procedures?    no  If yes, update Summary List   Subjective Functional Status/Changes:  []  No changes reported     My shoulder has still been hurting a lot especially when I sit for a long time and at night when I am trying to sleep and it wakes me up. I think that I need to invest in a better pillow.          OBJECTIVE  Modalities Rationale:     decrease pain and increase tissue extensibility to improve patient's ability to improve functional abilities    min [] Estim, type/location:                                      []  att     []  unatt     []  w/US     []  w/ice    []  w/heat    min []  Mechanical Traction: type/lbs                   []  pro   []  sup   []  int   []  cont    []  before manual    []  after manual    min []  Ultrasound, settings/location:      min []  Iontophoresis w/ dexamethasone, location:                                               []  take home patch       []  in clinic   10 min []  Ice     [x]  Heat    location/position: Cervical/seated    min []  Vasopneumatic Device, press/temp:    If using vaso (only need to measure limb vaso being performed on)      pre-treatment girth :       post-treatment girth :       measured at (landmark location) :    Vasopnuematic compression justification:  Per bilateral girth measures taken and listed above the edema is considered significant and having an impact on the patient's     min [] Other:    [x] Skin assessment post-treatment (if applicable):    [x]  intact    [x]  redness- no adverse reaction                  []redness - adverse reaction:      35 min Therapeutic Exercise:  see flow sheet   Rationale: increase ROM, increase strength, improve coordination, improve balance, and increase proprioception to improve the patients ability to progress to functional activities limited by pain or other dysfunction     15 min Therapeutic Activity:  see flow sheet   Rationale: to improve functional activities, model real life movements and performance specific to the patients need with supervision to return the patient to their PLOF     Billed With/As:   [] TE   [] TA   [] Neuro   [] Self Care Patient Education: [] Review HEP    [] Progressed/Changed HEP based on:   [] positioning   [] body mechanics   [] transfers   [] heat/ice application    [] other:      Other Objective/Functional Measures:  Verbal cueing/demonstration for proper form/technique with various exercises/activities during treatment. Post Treatment Pain Level (on 0 to 10) scale:   0  / 10     ASSESSMENT  Assessment/Changes in Function:   See progress note/physician update for full detailed progress towards all established goals. [x]  See Progress Note/Recertification   Patient will continue to benefit from skilled PT services to modify and progress therapeutic interventions, address functional mobility deficits, address ROM deficits, address strength deficits, analyze and address soft tissue restrictions, analyze and cue movement patterns, analyze and modify body mechanics/ergonomics, assess and modify postural abnormalities, and instruct in home and community integration to attain remaining goals. Progress toward goals / Updated goals:  See progress note/physician update for full detailed progress towards all established goals.       PLAN  [x]  Upgrade activities as tolerated yes Continue plan of care   []  Discharge due to : []  Other:      Therapist: Yandel Navarro PTA    Date: 12/12/2022 Time: 4:23 PM     Future Appointments   Date Time Provider Nadine Monaco   12/12/2022  5:30 PM Brent Ramírez [facility]   12/14/2022  6:00 PM Emile Rodriguez, ILANA [facility]   12/19/2022  5:30 PM Emile Rodriguez, PTA [facility]   12/21/2022  5:15 PM Emile Rodriguez, ILANA [facility]   12/28/2022  6:00 PM Aryan Ramirez, PT [facility]   1/3/2023  6:00 PM Cody Shaikh, PT [facility]

## 2022-12-12 NOTE — PROGRESS NOTES
[address] [place] [address] PHYSICAL THERAPY  [facility], [place], [address] - Phone: [phone]  Fax: [phone]  PROGRESS NOTE  Patient Name: Lucretia Davies : 1975   Treatment/Medical Diagnosis: Neck pain [M54.2]   Referral Source: Abdulaziz Ma MD     Date of Initial Visit: 11/10/22 Attended Visits: 3 Missed Visits: 1     SUMMARY OF TREATMENT  Therapeutic exercise for cervical mobility and strengthening, postural awareness, cervicothoracic stabilization, manual intervention, moist heat, patient education and HEP. CURRENT STATUS  Pt reports 10% overall improvement in sx since beginning care. Pt reports 8/10 max pain, 7/10 avg pain. Pain made worse with prolonged sitting > 4-5 hours as well as with prolonged static positioning especially at night with pain that interrupts sleep. Pt's minimal progress/improvement can be directly contributed to only attending 2nd follow up treatment since initial evaluation due to pending insurance authorization preventing her from starting therapy for > 3 weeks. Improvements: Pt reports the most functional improvement with using HEP to help manage pain/symptoms upon onset especially with exacerbating activities. Remaining functional limitations: Increased limitations/pain/symptoms with prolonged sitting > 4-5 hours as well as with prolonged static positioning especially at night with pain that interrupts sleep. Objective Measurements:  Cervical AROM= Flexion= 22 deg; Extension= 55 deg; Side bending= R= 45 deg, L= 40 deg; Rotation= R= 65 deg, L= 75 deg  Shoulder strength measurements/MMT= (R/L)= Flexion= 4-/5 bilaterally; Abduction= 4-/5 bilaterally; ER= 4/5 bilaterally; IR= 5/5, 4+/5    FOTO 46/100    Goal/Measure of Progress Goal Met? 1. Patient will be independent and compliant with HEP in order to progress toward long term goals.    Status at last Eval: issued and reviewed Current Status: Pt reports independence and compliance with current HEP multiple times a day to help manage symptoms   yes   2. Patient will improve FOTO assessment score to 67 pts in order to indicate improved functional abilities. Status at last Eval: 49/100 Current Status: 46/100 no   3. Patient will improve cervical rotation to 65 deg or better without pain bilaterally to drive safely   Status at last Eval:  rotate R 53 deg p! rotate L 65 deg p! Current Status: R= 65 deg, L= 75 deg yes     Goal/Measure of Progress Goal Met? 4. Patient will report worst shoulder pain as 2/10 or less in order to progress toward personal goals. Status at last Eval:  9/10 Current Status:  8/10 progress    5. Patient will report overall at least 75% improvement in function in order to progress toward premorbid status. Status at last Eval:  N/a Current Status:  10% improvement reported progress    6. Patient will report headaches 1x per week or less for improved QOL   Status at last Eval:  daily headaches  Current Status:  Pt reports headache frequency of approximately 3x/week lately  progress     New Goals to be achieved in __5-9__  treatments:  1. Patient will improve FOTO assessment score to 67 pts in order to indicate improved functional abilities. 2. Patient will report worst shoulder pain as 2/10 or less in order to progress toward personal goals. 3. Patient will report overall at least 75% improvement in function in order to progress toward premorbid status. 4.Patient will report headaches 1x per week or less for improved QOL    RECOMMENDATIONS  Continue PT 1-2x/wk x 4 weeks to address remaining impairments and functional limitations in order to improve patients ADL tolerance and functional mobility. If you have any questions/comments please contact us directly at [phone]. Thank you for allowing us to assist in the care of your patient.     Therapist Signature: Oliva Rodriguez, PTA Date: 12/12/2022    Freddy Velazquez PT, JANICE COPELAND Time: 4:26 PM   NOTE TO PHYSICIAN:  PLEASE COMPLETE THE ORDERS BELOW AND FAX TO   [facility]: [phone]  If you are unable to process this request in 24 hours please contact our office: [phone]    ___ I have read the above report and request that my patient continue as recommended.   ___ I have read the above report and request that my patient continue therapy with the following changes/special instructions:_________________________________________________________   ___ I have read the above report and request that my patient be discharged from therapy.      Physician Signature:        Date:       Time:       Ximena Lambert MD

## 2022-12-14 ENCOUNTER — HOSPITAL ENCOUNTER (OUTPATIENT)
Dept: PHYSICAL THERAPY | Age: 47
Discharge: HOME | End: 2022-12-14
Payer: COMMERCIAL

## 2022-12-14 PROCEDURE — 97110 THERAPEUTIC EXERCISES: CPT

## 2022-12-14 PROCEDURE — 97140 MANUAL THERAPY 1/> REGIONS: CPT

## 2022-12-14 PROCEDURE — 97530 THERAPEUTIC ACTIVITIES: CPT

## 2022-12-14 NOTE — PROGRESS NOTES
PHYSICAL THERAPY - DAILY TREATMENT NOTE    Patient Name: Rupinder Garcia        Date: 2022  : 1975   yes Patient  Verified  Visit #:   4     Insurance: Payor: Srinivas Zabala / Plan: [place] [employer] PPO / Product Type: PPO /      In time: 6:01 Out time: 6:49   Total Treatment Time: 48     Medicare/[facility] Time Tracking (below)   Total Timed Codes (min):   1:1 Treatment Time:       TREATMENT AREA =  Neck pain [M54.2]    SUBJECTIVE  Pain Level (on 0 to 10 scale):  4  / 10   Medication Changes/New allergies or changes in medical history, any new surgeries or procedures?    no  If yes, update Summary List   Subjective Functional Status/Changes:  []  No changes reported     My neck feels a lot better than it did when I was here on Monday, but I slept on my back without a pillow and I didn't have to do as much sitting today.               30 min Therapeutic Exercise:  see flow sheet   Rationale: increase ROM, increase strength, improve coordination, improve balance, and increase proprioception to improve the patients ability to progress to functional activities limited by pain or other dysfunction     10 min Therapeutic Activity:  see flow sheet   Rationale: to improve functional activities, model real life movements and performance specific to the patients need with supervision to return the patient to their PLOF      8 min Manual Therapy: STM/tissue mobs to L>R cervical musculature in sitting    Rationale: decrease pain, increase ROM, increase tissue extensibility, decrease trigger points, and increase postural awareness to improve functional mobility   The manual therapy interventions were performed at a separate and distinct time from the therapeutic activities interventions    Billed With/As:   [] TE   [] TA   [] Neuro   [] Self Care Patient Education: [] Review HEP    [] Progressed/Changed HEP based on:   [] positioning   [] body mechanics   [] transfers   [] heat/ice application    [] other: Other Objective/Functional Measures:  Verbal cueing/demonstration for proper form/technique with various exercises/activities during treatment. Addition of theraband lower trap walkouts and cervicothoracic stabs with 2 lbs      Post Treatment Pain Level (on 0 to 10) scale:   0  / 10     ASSESSMENT  Assessment/Changes in Function:   Pt presented with decreased intensity of cervical pain/symptoms with changing sleeping position as well as decreased prolonged sitting activity since last treatment. Pt was also able to advance to addition of  theraband lower trap walkouts and cervicothoracic stabs with 2 lbs with min to mod challenge today. Will continue to progress/advance within current POC with monitoring symptoms as tolerated. []  See Progress Note/Recertification   Patient will continue to benefit from skilled PT services to modify and progress therapeutic interventions, address functional mobility deficits, address ROM deficits, address strength deficits, analyze and address soft tissue restrictions, analyze and cue movement patterns, analyze and modify body mechanics/ergonomics, assess and modify postural abnormalities, and instruct in home and community integration to attain remaining goals. Progress toward goals / Updated goals:  New Goals to be achieved in __5-9__  treatments:  1. Patient will improve FOTO assessment score to 67 pts in order to indicate improved functional abilities. 2. Patient will report worst shoulder pain as 2/10 or less in order to progress toward personal goals. 3. Patient will report overall at least 75% improvement in function in order to progress toward premorbid status.   4.Patient will report headaches 1x per week or less for improved QOL     PLAN  [x]  Upgrade activities as tolerated yes Continue plan of care   []  Discharge due to :    []  Other:      Therapist: Siri Delcid PTA    Date: 12/14/2022 Time: 6:09 PM     Future Appointments   Date Time Provider Department [place]   12/19/2022  5:30 PM Matt Peralta PTA [facility]   12/21/2022  6:00 PM Matt Peralta PTA [facility]   12/28/2022  6:00 PM Juanita Vargas, PT [facility]   1/3/2023  6:00 PM Davis Smith, PT [facility]

## 2022-12-19 ENCOUNTER — HOSPITAL ENCOUNTER (OUTPATIENT)
Dept: PHYSICAL THERAPY | Age: 47
Discharge: HOME | End: 2022-12-19
Payer: COMMERCIAL

## 2022-12-19 PROCEDURE — 97530 THERAPEUTIC ACTIVITIES: CPT

## 2022-12-19 PROCEDURE — 97110 THERAPEUTIC EXERCISES: CPT

## 2022-12-19 PROCEDURE — 97140 MANUAL THERAPY 1/> REGIONS: CPT

## 2022-12-19 NOTE — PROGRESS NOTES
PHYSICAL THERAPY - DAILY TREATMENT NOTE    Patient Name: Edgar Borden        Date: 2022  : 1975   yes Patient  Verified  Visit #:   5     Insurance: Payor: Karan Kawasaki / Plan: [place] [employer] / Product Type: PPO /      In time: 5:33 Out time: 6:35   Total Treatment Time: 62     Medicare/[facility] Time Tracking (below)   Total Timed Codes (min):   1:1 Treatment Time:       TREATMENT AREA =  Neck pain [M54.2]    SUBJECTIVE  Pain Level (on 0 to 10 scale):  0  / 10   Medication Changes/New allergies or changes in medical history, any new surgeries or procedures?    no  If yes, update Summary List   Subjective Functional Status/Changes:  []  No changes reported     My neck has been doing really good, I really haven't had any pain that I can think of over the past few days, so I think that we are on the right track.           OBJECTIVE  Modalities Rationale:     decrease pain and increase tissue extensibility to improve patient's ability to improve functional abilities    min [] Estim, type/location:                                      []  att     []  unatt     []  w/US     []  w/ice    []  w/heat    min []  Mechanical Traction: type/lbs                   []  pro   []  sup   []  int   []  cont    []  before manual    []  after manual    min []  Ultrasound, settings/location:      min []  Iontophoresis w/ dexamethasone, location:                                               []  take home patch       []  in clinic   10 min []  Ice     [x]  Heat    location/position: Cervical/seated     min []  Vasopneumatic Device, press/temp:    If using vaso (only need to measure limb vaso being performed on)      pre-treatment girth :       post-treatment girth :       measured at (landmark location) :    Vasopnuematic compression justification:  Per bilateral girth measures taken and listed above the edema is considered significant and having an impact on the patient's     min []  Other:    [x] Skin assessment post-treatment (if applicable):    [x]  intact    [x]  redness- no adverse reaction                  []redness - adverse reaction:      32 min Therapeutic Exercise:  see flow sheet   Rationale: increase ROM, increase strength, improve coordination, improve balance, and increase proprioception to improve the patients ability to progress to functional activities limited by pain or other dysfunction     10 min Therapeutic Activity:  see flow sheet   Rationale: to improve functional activities, model real life movements and performance specific to the patients need with supervision to return the patient to their PLOF      10 min Manual Therapy: STM/tissue mobs to R>L cervical musculature in sitting    Rationale: decrease pain, increase ROM, increase tissue extensibility, decrease trigger points, and increase postural awareness to improve functional mobility   The manual therapy interventions were performed at a separate and distinct time from the therapeutic activities interventions    Billed With/As:   [] TE   [x] TA   [] Neuro   [] Self Care Patient Education: [x] Review HEP    [] Progressed/Changed HEP based on:   [] positioning   [] body mechanics   [] transfers   [] heat/ice application    [] other:      Other Objective/Functional Measures:  Verbal cueing/demonstration for proper form/technique with various exercises/activities during treatment. Updated and issued revised HEP today      Post Treatment Pain Level (on 0 to 10) scale:   0  / 10     ASSESSMENT  Assessment/Changes in Function:   Pt presented with minimal to no reoccurrence of cervical symptoms over the past 2-3 days as well as decreased tissue tension/congestion with manual intervention compared to the past few treatments. However, she presented with more tension in R>L cervical musculature despite c/o L>R tension/discomfort. Will continue to progress/advance within current POC with monitoring symptoms as tolerated.        []  See Progress Note/Recertification   Patient will continue to benefit from skilled PT services to modify and progress therapeutic interventions, address functional mobility deficits, address ROM deficits, address strength deficits, analyze and address soft tissue restrictions, analyze and cue movement patterns, analyze and modify body mechanics/ergonomics, assess and modify postural abnormalities, and instruct in home and community integration to attain remaining goals. Progress toward goals / Updated goals:  New Goals to be achieved in __5-9__  treatments:  1. Patient will improve FOTO assessment score to 67 pts in order to indicate improved functional abilities. 2. Patient will report worst shoulder pain as 2/10 or less in order to progress toward personal goals. 3. Patient will report overall at least 75% improvement in function in order to progress toward premorbid status.   4.Patient will report headaches 1x per week or less for improved QOL     PLAN  [x]  Upgrade activities as tolerated yes Continue plan of care   []  Discharge due to :    []  Other:      Therapist: Laura Chen PTA    Date: 12/19/2022 Time: 5:48 PM     Future Appointments   Date Time Provider Nadine Monaco   12/21/2022  6:00 PM Lambert Dailey [facility]   12/28/2022  6:00 PM Federico Gamboa, ALIDA [facility]   1/3/2023  6:00 PM Stephan Long, PT [facility]

## 2022-12-21 ENCOUNTER — APPOINTMENT (OUTPATIENT)
Dept: PHYSICAL THERAPY | Age: 47
End: 2022-12-21
Payer: COMMERCIAL

## 2022-12-28 ENCOUNTER — HOSPITAL ENCOUNTER (OUTPATIENT)
Dept: PHYSICAL THERAPY | Age: 47
End: 2022-12-28
Payer: COMMERCIAL

## 2022-12-29 ENCOUNTER — HOSPITAL ENCOUNTER (OUTPATIENT)
Dept: PHYSICAL THERAPY | Age: 47
End: 2022-12-29
Payer: COMMERCIAL

## 2022-12-29 ENCOUNTER — TELEPHONE (OUTPATIENT)
Dept: PHYSICAL THERAPY | Age: 47
End: 2022-12-29

## 2023-01-03 ENCOUNTER — APPOINTMENT (OUTPATIENT)
Dept: PHYSICAL THERAPY | Age: 48
End: 2023-01-03
Payer: COMMERCIAL

## 2023-01-05 ENCOUNTER — APPOINTMENT (OUTPATIENT)
Dept: PHYSICAL THERAPY | Age: 48
End: 2023-01-05
Payer: COMMERCIAL

## 2023-01-17 ENCOUNTER — HOSPITAL ENCOUNTER (OUTPATIENT)
Dept: PHYSICAL THERAPY | Age: 48
Discharge: HOME | End: 2023-01-17
Payer: COMMERCIAL

## 2023-01-17 PROCEDURE — 97530 THERAPEUTIC ACTIVITIES: CPT

## 2023-01-17 PROCEDURE — 97110 THERAPEUTIC EXERCISES: CPT

## 2023-01-17 NOTE — PROGRESS NOTES
PHYSICAL THERAPY - DAILY TREATMENT NOTE    Patient Name: Teagan Warner        Date: 2023  : 1975   yes Patient  Verified  Visit #:   6     Insurance: Payor: Malik Cardona / Plan: [place] [employer] [place] / Product Type: PPO /      In time: 5:45 Out time: 6:20   Total Treatment Time: 35     Medicare/[facility] Time Tracking (below)   Total Timed Codes (min):  25 1:1 Treatment Time:       TREATMENT AREA =  Neck pain [M54.2]    SUBJECTIVE  Pain Level (on 0 to 10 scale):  0  / 10   Medication Changes/New allergies or changes in medical history, any new surgeries or procedures?    no  If yes, update Summary List   Subjective Functional Status/Changes:  []  No changes reported     See PN         OBJECTIVE  Modalities Rationale:     decrease pain and increase tissue extensibility to improve patient's ability to improve functional abilities    min [] Estim, type/location:                                      []  att     []  unatt     []  w/US     []  w/ice    []  w/heat    min []  Mechanical Traction: type/lbs                   []  pro   []  sup   []  int   []  cont    []  before manual    []  after manual    min []  Ultrasound, settings/location:      min []  Iontophoresis w/ dexamethasone, location:                                               []  take home patch       []  in clinic   10 min []  Ice     [x]  Heat    location/position: Cervical/seated     min []  Vasopneumatic Device, press/temp:    If using vaso (only need to measure limb vaso being performed on)      pre-treatment girth :       post-treatment girth :       measured at (landmark location) :    Vasopnuematic compression justification:  Per bilateral girth measures taken and listed above the edema is considered significant and having an impact on the patient's     min []  Other:    [x] Skin assessment post-treatment (if applicable):    [x]  intact    [x]  redness- no adverse reaction                  []redness - adverse reaction:      10 min Therapeutic Exercise:  see flow sheet   Rationale: increase ROM, increase strength, improve coordination, improve balance, and increase proprioception to improve the patients ability to progress to functional activities limited by pain or other dysfunction     15 min Therapeutic Activity:  see flow sheet  -including thorough reassessment for PN   Rationale: to improve functional activities, model real life movements and performance specific to the patients need with supervision to return the patient to their PLOF      TC min Manual Therapy: STM/tissue mobs to R>L cervical musculature in sitting    Rationale: decrease pain, increase ROM, increase tissue extensibility, decrease trigger points, and increase postural awareness to improve functional mobility   The manual therapy interventions were performed at a separate and distinct time from the therapeutic activities interventions    Billed With/As:   [] TE   [x] TA   [] Neuro   [] Self Care Patient Education: [x] Review HEP    [] Progressed/Changed HEP based on:   [] positioning   [] body mechanics   [] transfers   [] heat/ice application    [] other:      Other Objective/Functional Measures:  Pt arrives 14 min late for session       Post Treatment Pain Level (on 0 to 10) scale:   4 / 10     ASSESSMENT  Assessment/Changes in Function:   See PN     []  See Progress Note/Recertification   Patient will continue to benefit from skilled PT services to modify and progress therapeutic interventions, address functional mobility deficits, address ROM deficits, address strength deficits, analyze and address soft tissue restrictions, analyze and cue movement patterns, analyze and modify body mechanics/ergonomics, assess and modify postural abnormalities, and instruct in home and community integration to attain remaining goals.    Progress toward goals / Updated goals:    See PN     PLAN  [x]  Upgrade activities as tolerated yes Continue plan of care   []  Discharge due to : []  Other:      Therapist: Oliver Vaca.  Edwige Thurman, PT    Date: 1/17/2023 Time: 6:23 PM      Future Appointments   Date Time Provider Nadine Monaco   1/25/2023  6:00 PM Lambert Cox [facility]   1/31/2023  6:00 PM Bernadette Nobles, PT [facility]   2/2/2023  6:00 PM Kylee Gregory PT [facility]   2/6/2023  6:00 PM Jose Ward PT [facility]   2/8/2023  6:00 PM Flakita Allison PTA [facility]

## 2023-01-17 NOTE — PROGRESS NOTES
[address] [place] [address] PHYSICAL THERAPY  [address], [place], [address] - Phone: [phone]  Fax: [phone]  PROGRESS NOTE  Patient Name: Annabelle Martel : 1975   Treatment/Medical Diagnosis: Neck pain [M54.2]   Referral Source: Allison Guillermo MD     Date of Initial Visit: 11/10/22 Attended Visits: 5 Missed Visits: 3     SUMMARY OF TREATMENT  Therapeutic exercise for cervical mobility and strengthening, postural awareness, cervicothoracic stabilization, manual intervention, moist heat, patient education and HEP. CURRENT STATUS  Pt reports 50% overall improvement in sx's since [facility]. Pt demonstrates slow, steady progress with increased c/s ROM and decreased HA/neck pain with ADL's, however progress has been limited due to decreased PT attendance with last session prior to this one on 22. Improvements: decreased HA frequency, decreased intensity of neck pain, improved tolerance for bending   Remaining functional limitations: prolonged sitting > 3-4 hours, sleeping     Objective Measurements:  PAIN: 8/10 max pain (prolonged sitting, and too much bending activity), 5/10 average daily pain, 3/10 least pain  AROM:  CERVICAL: Flex 26 (\"tight\"), Ext 45 (c/o lightheaded on return), SB R 41, L 38. ROT R 76, L 84  STRENGTH: Flex R 4-/5, L 4-/5. Abd R 4-/5, L 4-/5. Bicep R 5/5, L 5/5. IR R 5/5, L 5/5. ER R 3+/5, L 3+/5  PALPATION: Significant ttp, moderate hypertonicity to b/l c/s paraspinals, UT, sub-occipitals  SPECIAL TESTS: Deep c/s neck flexor endurance 5\" p! Long Term Goals to be achieved in __5-9__  treatments:  1. Patient will improve FOTO assessment score to 67 pts in order to indicate improved functional abilities. Status at last note/certification: :  46  Current:   : Goal Not Met: 39   2. Patient will report worst shoulder pain as 2/10 or less in order to progress toward personal goals.   Status at last note/certification: : 8/10  Current:   1/17: Goal not Met: max pain 8/10   3. Patient will report overall at least 75% improvement in function in order to progress toward premorbid status. Status at last note/certification: 71/80: 10% improvement reported  Current:   1/17: Goal in progress; reports 50% improvement   4. Patient will report headaches 1x per week or less for improved QOL  Status at last note/certification: 74/22:  Pt reports headache frequency of approximately 3x/week lately   Current:  1/17: Goal in progress; averaging ~0-1 HA's/wk       ==============================================================================  New Goals to be achieved in __8__  treatments:  1. Patient will improve FOTO assessment score to 67 pts in order to indicate improved functional abilities. Status at last note/certification: 3/33: 39  Current:     2. Patient will report average daily pain as 2/10 or less in order to progress toward personal goals. Status at last note/certification: 2/75: 5/10  Current:     3. Patient will increase b/l shoulder ER to >/= 4-/5 for increased tolerance with household chores/activities. Status at last note/certification: 1/58: R 3+/5, L 3+/5  Current:     4. Patient will improve deep c/s neck flexor endurance to at least 10\" for decreased HA's  Status at last note/certification: 6/05:  5\" p! Current:      RECOMMENDATIONS  Continue PT 1-2x/wk x 4 weeks to address remaining impairments and functional limitations in order to improve patients ADL tolerance and functional mobility. If you have any questions/comments please contact us directly at [phone]. Thank you for allowing us to assist in the care of your patient. Therapist Signature: Jeancarlos Ordaz PT Date: 1/17/2023     Time: 6:22 PM    NOTE TO PHYSICIAN:  PLEASE COMPLETE THE ORDERS BELOW AND FAX TO   [facility]: [phone]  If you are unable to process this request in 24 hours please contact our office: [phone]    ___ I have read the above report and request that my patient continue as recommended.   ___ I have read the above report and request that my patient continue therapy with the following changes/special instructions:_________________________________________________________   ___ I have read the above report and request that my patient be discharged from therapy.      Physician Signature:        Date:       Time:       Allison Guillermo MD

## 2023-01-18 NOTE — TELEPHONE ENCOUNTER
Patient is calling, because her left leg is swollen around her knee. She can hardly walk. Patient is worried that it is a blood clot. Please call [phone].
Spoke with patient. Having knee pain & swelling for over a week. Some calf pain, mostly in knee. Instructed her to go to ER or UC. She said that she doesn't want to go there that the pain is in the side of knee. Appointment scheduled for 8/10/21. Patient instructed to go to UC if gets worse. Patient verbalized understanding.
Abnormal FHR

## 2023-01-25 ENCOUNTER — HOSPITAL ENCOUNTER (OUTPATIENT)
Dept: PHYSICAL THERAPY | Age: 48
Discharge: HOME | End: 2023-01-25
Payer: COMMERCIAL

## 2023-01-25 PROCEDURE — 97110 THERAPEUTIC EXERCISES: CPT

## 2023-01-25 PROCEDURE — 97140 MANUAL THERAPY 1/> REGIONS: CPT

## 2023-01-25 PROCEDURE — 97530 THERAPEUTIC ACTIVITIES: CPT

## 2023-01-25 NOTE — PROGRESS NOTES
PHYSICAL THERAPY - DAILY TREATMENT NOTE    Patient Name: Sasha Iraheta        Date: 2023  : 1975   yes Patient  Verified  Visit #:   7   of   10-14  Insurance: Payor: Katiuska Peters / Plan: Kip Berman PPO / Product Type: PPO /      In time: 6:00 Out time: 6:54   Total Treatment Time: 54     Medicare/[facility] Time Tracking (below)   Total Timed Codes (min):   1:1 Treatment Time:       TREATMENT AREA =  Neck pain [M54.2]    SUBJECTIVE  Pain Level (on 0 to 10 scale):  5  / 10   Medication Changes/New allergies or changes in medical history, any new surgeries or procedures? yes  If yes, update Summary List   Subjective Functional Status/Changes:  []  No changes reported     My neck has been doing better, but I still feel the pain that comes and goes depending on what I am doing and I think that stress affects it as well.            OBJECTIVE  Modalities Rationale:     decrease pain and increase tissue extensibility to improve patient's ability to improve functional abilities    min [] Estim, type/location:                                      []  att     []  unatt     []  w/US     []  w/ice    []  w/heat    min []  Mechanical Traction: type/lbs                   []  pro   []  sup   []  int   []  cont    []  before manual    []  after manual    min []  Ultrasound, settings/location:      min []  Iontophoresis w/ dexamethasone, location:                                               []  take home patch       []  in clinic   10 min []  Ice     [x]  Heat    location/position: Cervical/seated     min []  Vasopneumatic Device, press/temp:    If using vaso (only need to measure limb vaso being performed on)      pre-treatment girth :       post-treatment girth :       measured at (landmark location) :    Vasopnuematic compression justification:  Per bilateral girth measures taken and listed above the edema is considered significant and having an impact on the patient's     min []  Other:    [x] Skin assessment post-treatment (if applicable):    [x]  intact    [x]  redness- no adverse reaction                  []redness - adverse reaction:      26 min Therapeutic Exercise:  see flow sheet   Rationale: increase ROM, increase strength, improve coordination, improve balance, and increase proprioception to improve the patients ability to progress to functional activities limited by pain or other dysfunction     10 min Therapeutic Activity:  see flow sheet   Rationale: to improve functional activities, model real life movements and performance specific to the patients need with supervision to return the patient to their PLOF      8 min Manual Therapy: STM/tissue mobs to bilateral cervical musculature in sitting    Rationale: decrease pain, increase ROM, increase tissue extensibility, decrease trigger points, and increase postural awareness to improve functional mobility   The manual therapy interventions were performed at a separate and distinct time from the therapeutic activities interventions    Billed With/As:   [] TE   [] TA   [] Neuro   [] Self Care Patient Education: [] Review HEP    [] Progressed/Changed HEP based on:   [] positioning   [] body mechanics   [] transfers   [] heat/ice application    [] other:      Other Objective/Functional Measures:  Verbal cueing/demonstration for proper form/technique with various exercises/activities during treatment. Addition of Pallof presses to program today      Post Treatment Pain Level (on 0 to 10) scale:   3  / 10     ASSESSMENT  Assessment/Changes in Function:   Pt presented with chief c/o increased bilateral cervical pain/tension from combination of repetitive UE reaching ADL's as well as environment stress since last treatment. However,she was able to tolerate full normal therex regiment including addition of Pallof presses with min to mod challenge today.  Pt was slightly more limited with L cervical rotation AROM which was addressed with re introduction of L cervical rotation SNAGS with good relief today. Will continue to progress/advance within current POC with monitoring symptoms as tolerated. []  See Progress Note/Recertification   Patient will continue to benefit from skilled PT services to modify and progress therapeutic interventions, address functional mobility deficits, address ROM deficits, address strength deficits, analyze and address soft tissue restrictions, analyze and cue movement patterns, analyze and modify body mechanics/ergonomics, assess and modify postural abnormalities, and instruct in home and community integration to attain remaining goals. Progress toward goals / Updated goals:  New Goals to be achieved in __8__  treatments:  1. Patient will improve FOTO assessment score to 67 pts in order to indicate improved functional abilities. Status at last note/certification: 6/13: 39  Current:     2. Patient will report average daily pain as 2/10 or less in order to progress toward personal goals. Status at last note/certification: 5/62: 5/10  Current:     3. Patient will increase b/l shoulder ER to >/= 4-/5 for increased tolerance with household chores/activities. Status at last note/certification: 6/45: R 3+/5, L 3+/5  Current:     4. Patient will improve deep c/s neck flexor endurance to at least 10\" for decreased HA's  Status at last note/certification: 7/41:  5\" p!   Current:       PLAN  [x]  Upgrade activities as tolerated yes Continue plan of care   []  Discharge due to :    []  Other:      Therapist: Dena Chilel PTA    Date: 1/25/2023 Time: 6:04 PM     Future Appointments   Date Time Provider Nadine Monaco   1/31/2023  6:00 PM Rich Bill, PT [facility]   2/2/2023  6:00 PM Charles Lopez, PT [facility]   2/6/2023  6:00 PM Wilson Pate, PT [facility]   2/8/2023  6:00 PM Sonya Bardales PTA [facility]

## 2023-01-31 ENCOUNTER — HOSPITAL ENCOUNTER (OUTPATIENT)
Dept: PHYSICAL THERAPY | Age: 48
Discharge: HOME | End: 2023-01-31
Payer: COMMERCIAL

## 2023-01-31 PROCEDURE — 97140 MANUAL THERAPY 1/> REGIONS: CPT

## 2023-01-31 PROCEDURE — 97530 THERAPEUTIC ACTIVITIES: CPT

## 2023-01-31 PROCEDURE — 97110 THERAPEUTIC EXERCISES: CPT

## 2023-01-31 NOTE — PROGRESS NOTES
PHYSICAL THERAPY - DAILY TREATMENT NOTE    Patient Name: Dawn Álvarez        Date: 2023  : 1975   yes Patient  Verified  Visit #:   8   of   10-14  Insurance: Payor: Viviana Ames / Plan: Abi Saldaña [place] PPO / Product Type: PPO /      In time: 6:00 PM Out time: 6:49   Total Treatment Time: 49     Medicare/[employer] Time Tracking (below)   Total Timed Codes (min):  45 1:1 Treatment Time:       TREATMENT AREA =  Neck pain [M54.2]    SUBJECTIVE  Pain Level (on 0 to 10 scale):  8  / 10   Medication Changes/New allergies or changes in medical history, any new surgeries or procedures? yes  If yes, update Summary List   Subjective Functional Status/Changes:  []  No changes reported     Pt c/o increased left sided neck/shoulder pain for the past 2 days with insidious onset; She had her S.O. massage her neck the other day and it helped a little.          OBJECTIVE  Modalities Rationale:     decrease pain and increase tissue extensibility to improve patient's ability to improve functional abilities    min [] Estim, type/location:                                      []  att     []  unatt     []  w/US     []  w/ice    []  w/heat    min []  Mechanical Traction: type/lbs                   []  pro   []  sup   []  int   []  cont    []  before manual    []  after manual    min []  Ultrasound, settings/location:      min []  Iontophoresis w/ dexamethasone, location:                                               []  take home patch       []  in clinic   TC min []  Ice     [x]  Heat    location/position: Cervical/seated     min []  Vasopneumatic Device, press/temp:    If using vaso (only need to measure limb vaso being performed on)      pre-treatment girth :       post-treatment girth :       measured at (landmark location) :    Vasopnuematic compression justification:  Per bilateral girth measures taken and listed above the edema is considered significant and having an impact on the patient's     min []  Other:    [x] Skin assessment post-treatment (if applicable):    [x]  intact    [x]  redness- no adverse reaction                  []redness - adverse reaction:      25  (-4 min UBE) min Therapeutic Exercise:  see flow sheet   Rationale: increase ROM, increase strength, improve coordination, improve balance, and increase proprioception to improve the patients ability to progress to functional activities limited by pain or other dysfunction     10 min Therapeutic Activity:  see flow sheet   Rationale: to improve functional activities, model real life movements and performance specific to the patients need with supervision to return the patient to their PLOF      10 min Manual Therapy: STM L>R post/mid scalene, UT, c/s paraspinals. MET's for b/l c/s ROT. Strain-counterstrain L post scalene   Rationale: decrease pain, increase ROM, increase tissue extensibility, decrease trigger points, and increase postural awareness to improve functional mobility   The manual therapy interventions were performed at a separate and distinct time from the therapeutic activities interventions    Billed With/As:   [x] TE   [] TA   [] Neuro   [] Self Care Patient Education: [] Review HEP    [] Progressed/Changed HEP based on:   [] positioning   [] body mechanics   [] transfers   [] heat/ice application    [] other:      Other Objective/Functional Measures:  L c/s ROT causing \"pulling\" Pain up L c-spine. Cervical ext WNL with posterior neck pain. R c/s SB with increased UT pain contralat.     -added standing retro scap shrugs with GTB resistance for UT release     Post Treatment Pain Level (on 0 to 10) scale:   4  / 10     ASSESSMENT  Assessment/Changes in Function:   Pt with significant hypertonicity L scalenes, moderate L UT; pt noting good improvement in pain reduction with cervical movements after manual therapy. Pt also reporting good relief of sx's with new exercise for UT release.  Pt ed on need to maintain c/s in neutral and avoid upper c/s extension with retractions and for postural check ins      []  See Progress Note/Recertification   Patient will continue to benefit from skilled PT services to modify and progress therapeutic interventions, address functional mobility deficits, address ROM deficits, address strength deficits, analyze and address soft tissue restrictions, analyze and cue movement patterns, analyze and modify body mechanics/ergonomics, assess and modify postural abnormalities, and instruct in home and community integration to attain remaining goals. Progress toward goals / Updated goals:  New Goals to be achieved in __8__  treatments:  1. Patient will improve FOTO assessment score to 67 pts in order to indicate improved functional abilities. Status at last note/certification: 5/32: 39  Current:     2. Patient will report average daily pain as 2/10 or less in order to progress toward personal goals. Status at last note/certification: 4/38: 5/10  Current:     3. Patient will increase b/l shoulder ER to >/= 4-/5 for increased tolerance with household chores/activities. Status at last note/certification: 2/03: R 3+/5, L 3+/5  Current:     4. Patient will improve deep c/s neck flexor endurance to at least 10\" for decreased HA's  Status at last note/certification: 7/62:  5\" p! Current:  1/31: Goal not met; cues for form with c/s retraction, not yet able to tolerate tuck/lift with exercises     PLAN  [x]  Upgrade activities as tolerated yes Continue plan of care   []  Discharge due to :    []  Other:      Therapist: Annie Quintana.  Paradise Fonseca, PT    Date: 1/31/2023 Time: 6:50 PM      Future Appointments   Date Time Provider Nadine Monaco   2/2/2023  6:00 PM Kit Shetty, [address] [facility]   2/6/2023  6:00 PM Milind Bustillos, PT [facility]   2/8/2023  6:00 PM Dar Yeboah, PTA [facility]

## 2023-02-02 ENCOUNTER — APPOINTMENT (OUTPATIENT)
Dept: PHYSICAL THERAPY | Age: 48
End: 2023-02-02
Payer: COMMERCIAL

## 2023-02-06 ENCOUNTER — HOSPITAL ENCOUNTER (OUTPATIENT)
Dept: PHYSICAL THERAPY | Age: 48
Discharge: HOME | End: 2023-02-06
Payer: COMMERCIAL

## 2023-02-06 PROCEDURE — 97112 NEUROMUSCULAR REEDUCATION: CPT

## 2023-02-06 PROCEDURE — 97140 MANUAL THERAPY 1/> REGIONS: CPT

## 2023-02-06 PROCEDURE — 97110 THERAPEUTIC EXERCISES: CPT

## 2023-02-06 NOTE — PROGRESS NOTES
PHYSICAL THERAPY - DAILY TREATMENT NOTE    Patient Name: Teagan Warner        Date: 2023  : 1975   yes Patient  Verified  Visit #:   9   of   10-14  Insurance: Payor: Malik Cardona / Plan: Blair Godfreyer PPO / Product Type: PPO /      In time: 6:00 Out time: 6:53   Total Treatment Time: 53     Medicare/[facility] Time Tracking (below)   Total Timed Codes (min):   1:1 Treatment Time:       TREATMENT AREA =  Neck pain [M54.2]    SUBJECTIVE  Pain Level (on 0 to 10 scale):  6  / 10   Medication Changes/New allergies or changes in medical history, any new surgeries or procedures?     yes  If yes, update Summary List   Subjective Functional Status/Changes:  []  No changes reported     Pt reports her pain is much better since her last session \"I've been doing my exercises\"       OBJECTIVE  Modalities Rationale:     decrease pain and increase tissue extensibility to improve patient's ability to improve functional abilities    min [] Estim, type/location:                                      []  att     []  unatt     []  w/US     []  w/ice    []  w/heat    min []  Mechanical Traction: type/lbs                   []  pro   []  sup   []  int   []  cont    []  before manual    []  after manual    min []  Ultrasound, settings/location:      min []  Iontophoresis w/ dexamethasone, location:                                               []  take home patch       []  in clinic   10 min []  Ice     [x]  Heat    location/position: To c/s in prone    min []  Vasopneumatic Device, press/temp:    If using vaso (only need to measure limb vaso being performed on)      pre-treatment girth :       post-treatment girth :       measured at (landmark location) :    Vasopnuematic compression justification:  Per bilateral girth measures taken and listed above the edema is considered significant and having an impact on the patient's     min []  Other:    [x] Skin assessment post-treatment (if applicable):    [x]  intact    [x]  redness- no adverse reaction                  []redness - adverse reaction:      18 min Therapeutic Exercise:  see flow sheet   Rationale: increase ROM, increase strength, improve coordination, improve balance, and increase proprioception to improve the patients ability to progress to functional activities limited by pain or other dysfunction     15 min Neuromuscular re-education: see flow sheet   Rationale: to improve functional activities, model real life movements and performance specific to the patients need with supervision to return the patient to their PLOF      10 min Manual Therapy: STM to scalenes, pec minor, TPR to L UT, L C4-7 UPA, B c/s sideglides   Rationale: decrease pain, increase ROM, increase tissue extensibility, decrease trigger points, and increase postural awareness to improve functional mobility   The manual therapy interventions were performed at a separate and distinct time from the therapeutic activities interventions    Billed With/As:   [] TE   [] TA   [x] Neuro   [] Self Care Patient Education: [x] Review HEP    [x] Progressed/Changed HEP based on: added TA march and deep cervical flexion  [] positioning   [] body mechanics   [] transfers   [] heat/ice application    [] other:      Other Objective/Functional Measures:    -added small ball c/s retraction to C/T stabs  -added TA march; heavy cueing to reduce UT/trunk flexion compensation  -added supine chin tuck with lift to improve deep cervical endurance; challenged to perform w/o SCM compensation  -TTP to L UT, limited sideglide and PA mobility L C4-7 with inc TTP to TPs     Post Treatment Pain Level (on 0 to 10) scale:   3  / 10     ASSESSMENT  Assessment/Changes in Function:     Pt reported marked reduction in pain post tx session. Updated HEP and pt return demo understanding. Pt ed on need for consistent HEP compliance as well as attendance to PT in order to progress with therapy. Pt acknowledged understanding.       []  See Progress Note/Recertification   Patient will continue to benefit from skilled PT services to modify and progress therapeutic interventions, address functional mobility deficits, address ROM deficits, address strength deficits, analyze and address soft tissue restrictions, analyze and cue movement patterns, analyze and modify body mechanics/ergonomics, assess and modify postural abnormalities, and instruct in home and community integration to attain remaining goals. Progress toward goals / Updated goals:  New Goals to be achieved in __8__  treatments:  1. Patient will improve FOTO assessment score to 67 pts in order to indicate improved functional abilities. Status at last note/certification: 6/26: 39  Current:     2. Patient will report average daily pain as 2/10 or less in order to progress toward personal goals. Status at last note/certification: 2/21: 5/10  Current:     3. Patient will increase b/l shoulder ER to >/= 4-/5 for increased tolerance with household chores/activities. Status at last note/certification: 6/09: R 3+/5, L 3+/5  Current:     4. Patient will improve deep c/s neck flexor endurance to at least 10\" for decreased HA's  Status at last note/certification: 3/59:  5\" p!   Current:  1/31: Goal not met; cues for form with c/s retraction, not yet able to tolerate tuck/lift with exercises  2/6/23: progressed to deep cervical flexion but with 1:1 supervision and heavy cueing     PLAN  [x]  Upgrade activities as tolerated yes Continue plan of care   []  Discharge due to :    []  Other:      Therapist: Jonah Tineo PT    Date: 2/6/2023 Time: 6:50 PM      Future Appointments   Date Time Provider Nadine Monaco   2/6/2023  6:00 PM Blanca Smith, PT [facility]   2/8/2023  6:00 PM Krystina Rosenberg PTA [facility]

## 2023-02-08 ENCOUNTER — HOSPITAL ENCOUNTER (OUTPATIENT)
Dept: PHYSICAL THERAPY | Age: 48
Discharge: HOME | End: 2023-02-08
Payer: COMMERCIAL

## 2023-02-08 PROCEDURE — 97110 THERAPEUTIC EXERCISES: CPT

## 2023-02-08 PROCEDURE — 97530 THERAPEUTIC ACTIVITIES: CPT

## 2023-02-08 PROCEDURE — 97140 MANUAL THERAPY 1/> REGIONS: CPT

## 2023-02-08 NOTE — PROGRESS NOTES
PHYSICAL THERAPY - DAILY TREATMENT NOTE    Patient Name: Murphy Moore        Date: 2023  : 1975   yes Patient  Verified  Visit #:   10   of   10-14  Insurance: Payor: Lacey Coats / Plan: Fareed Peralta PPO / Product Type: PPO /      In time: 6:04 Out time: 6:43   Total Treatment Time: 39     Medicare/[facility] Time Tracking (below)   Total Timed Codes (min):   1:1 Treatment Time:       TREATMENT AREA =  Neck pain [M54.2]    SUBJECTIVE  Pain Level (on 0 to 10 scale):  2  / 10   Medication Changes/New allergies or changes in medical history, any new surgeries or procedures?    no  If yes, update Summary List   Subjective Functional Status/Changes:  []  No changes reported     My neck has been feeling a whole lot better since I was here last, I still feel the pulling a little bit when I turn my head to the left, but the pain isn't as bad.            OBJECTIVE  Modalities Rationale:     decrease pain and increase tissue extensibility to improve patient's ability to improve functional abilities    min [] Estim, type/location:                                      []  att     []  unatt     []  w/US     []  w/ice    []  w/heat    min []  Mechanical Traction: type/lbs                   []  pro   []  sup   []  int   []  cont    []  before manual    []  after manual    min []  Ultrasound, settings/location:      min []  Iontophoresis w/ dexamethasone, location:                                               []  take home patch       []  in clinic   10 min []  Ice     [x]  Heat    location/position:     min []  Vasopneumatic Device, press/temp:    If using vaso (only need to measure limb vaso being performed on)      pre-treatment girth :       post-treatment girth :       measured at (landmark location) :    Vasopnuematic compression justification:  Per bilateral girth measures taken and listed above the edema is considered significant and having an impact on the patient's     min []  Other:    [] Skin assessment post-treatment (if applicable):    []  intact    []  redness- no adverse reaction                  []redness - adverse reaction:      19 min Therapeutic Exercise:  see flow sheet   Rationale: increase ROM, increase strength, improve coordination, improve balance, and increase proprioception to improve the patients ability to progress to functional activities limited by pain or other dysfunction     10 min Therapeutic Activity:  see flow sheet   Rationale: to improve functional activities, model real life movements and performance specific to the patients need with supervision to return the patient to their PLOF      10 min Manual Therapy: SOR, cervical A>P and bilateral side glide mobs, cervical rotation PROM bilaterally and MET to increase L cervical rotation end range    Rationale: decrease pain, increase ROM, increase tissue extensibility, decrease trigger points, and increase postural awareness to improve functional mobility   The manual therapy interventions were performed at a separate and distinct time from the therapeutic activities interventions    Billed With/As:   [] TE   [] TA   [] Neuro   [] Self Care Patient Education: [] Review HEP    [] Progressed/Changed HEP based on:   [] positioning   [] body mechanics   [] transfers   [] heat/ice application    [] other:      Other Objective/Functional Measures:  Verbal cueing/demonstration for proper form/technique with various exercises/activities during treatment. Resumed lower trap walkouts with program   Heavy cueing for supine chin tucks with lifts      Post Treatment Pain Level (on 0 to 10) scale:   2-3  / 10     ASSESSMENT  Assessment/Changes in Function:   Pt presents with the most functional improvement with decreased frequency and intensity of cervical pain/symptoms even with left cervical rotation with ADL's since last treatment.  However, pt did present with moderate restrictions with L cervical side glides, but improved gains with L cervical rotation end range with trial with MET to increase L cervical rotation with manual intervention today. Will continue to progress/advance within current POC with monitoring symptoms as tolerated. []  See Progress Note/Recertification   Patient will continue to benefit from skilled PT services to modify and progress therapeutic interventions, address functional mobility deficits, address ROM deficits, address strength deficits, analyze and address soft tissue restrictions, analyze and cue movement patterns, analyze and modify body mechanics/ergonomics, assess and modify postural abnormalities, and instruct in home and community integration to attain remaining goals. Progress toward goals / Updated goals:  New Goals to be achieved in __8__  treatments:  1. Patient will improve FOTO assessment score to 67 pts in order to indicate improved functional abilities. Status at last note/certification: 7/90: 39  Current:     2. Patient will report average daily pain as 2/10 or less in order to progress toward personal goals. Status at last note/certification: 1/97: 5/10  Current:     3. Patient will increase b/l shoulder ER to >/= 4-/5 for increased tolerance with household chores/activities. Status at last note/certification: 3/51: R 3+/5, L 3+/5  Current:     4. Patient will improve deep c/s neck flexor endurance to at least 10\" for decreased HA's  Status at last note/certification: 5/09:  5\" p! Current:  1/31: Goal not met; cues for form with c/s retraction, not yet able to tolerate tuck/lift with exercises  2/6/23: progressed to deep cervical flexion but with 1:1 supervision and heavy cueing     PLAN  [x]  Upgrade activities as tolerated yes Continue plan of care   []  Discharge due to :    []  Other:      Therapist: Monica Menjivar PTA    Date: 2/8/2023 Time: 6:10 PM     No future appointments.

## 2023-02-13 ENCOUNTER — HOSPITAL ENCOUNTER (OUTPATIENT)
Facility: HOSPITAL | Age: 48
Setting detail: RECURRING SERIES
Discharge: HOME | End: 2023-02-16
Payer: COMMERCIAL

## 2023-02-13 ENCOUNTER — APPOINTMENT (OUTPATIENT)
Facility: HOSPITAL | Age: 48
End: 2023-02-13
Payer: COMMERCIAL

## 2023-02-13 PROCEDURE — 97530 THERAPEUTIC ACTIVITIES: CPT

## 2023-02-13 PROCEDURE — 97140 MANUAL THERAPY 1/> REGIONS: CPT

## 2023-02-13 PROCEDURE — 97110 THERAPEUTIC EXERCISES: CPT

## 2023-02-13 NOTE — PROGRESS NOTES
PHYSICAL / OCCUPATIONAL THERAPY - DAILY TREATMENT NOTE (updated )    Patient Name: Radu Fan    Date: 2023    : 1975  Insurance: Payor: Heri Ferrell / Plan: [employer] PPO / Product Type: *No Product type* /      Patient  verified Yes     Visit #   Current / Total 11 14   Time   In / Out 5:53 6:36   Pain   In / Out 4/10 3/10   Subjective Functional Status/Changes: Pt reports her pain is slightly elevated pain this afternoon. Reports she drove to [facility] on Saturday (passenger) and had pain due to prolonged sitting in car. Changes to:  Meds, Allergies, Med Hx, Sx Hx? If yes, update Summary List no       TREATMENT AREA =  Cervicalgia [M54.2]    OBJECTIVE         Therapeutic Procedures: Tx Min Billable or 1:1 Min (if diff from Tx Min) Procedure, Rationale, Specifics     68889 Therapeutic Exercise (timed):  increase ROM, strength, coordination, balance, and proprioception to improve patient's ability to progress to PLOF and address remaining functional goals. (see flow sheet as applicable)     Details if applicable:       10  28225 Therapeutic Activity (timed):  use of dynamic activities replicating functional movements to increase ROM, strength, coordination, balance, and proprioception in order to improve patient's ability to progress to PLOF and address remaining functional goals. (see flow sheet as applicable)     Details if applicable:     10  84584 Manual Therapy (timed):  decrease pain, increase ROM, increase tissue extensibility, decrease trigger points, and increase postural awareness to improve patient's ability to progress to PLOF and address remaining functional goals. The manual therapy interventions were performed at a separate and distinct time from the therapeutic activities interventions .  (see flow sheet as applicable)     Details if applicable:  STM to B UT, c/s paraspinals, SOR; rhomboids, mid trap; t/s PA mob gr II          Details if applicable: Details if applicable:     37  [facility] Totals Reminder: bill using total billable min of TIMED therapeutic procedures (example: do not include dry needle or estim unattended, both untimed codes, in totals to left)  8-22 min = 1 unit; 23-37 min = 2 units; 38-52 min = 3 units; 53-67 min = 4 units; 68-82 min = 5 units   Total Total     [x]  Patient Education billed concurrently with other procedures   [x] Review HEP    [] Progressed/Changed HEP, detail:    [] Other detail:       Objective Information/Functional Measures/Assessment    Pt continues to require cueing to engage deep cervical flexors prior to flexing in supine. Consider seated TB chin tucks at NV. Pt ed on need for formal reassessment at NV for MD KENNY vs [place]. Patient will continue to benefit from skilled PT / OT services to modify and progress therapeutic interventions, analyze and address functional mobility deficits, analyze and address ROM deficits, analyze and address strength deficits, analyze and address soft tissue restrictions, analyze and cue for proper movement patterns, analyze and modify for postural abnormalities, and instruct in home and community integration to address functional deficits and attain remaining goals. Progress toward goals / Updated goals:  []  See Progress Note/Recertification    1. Patient will improve FOTO assessment score to 67 pts in order to indicate improved functional abilities. Status at last note/certification: 3/48: 39  Current:     2. Patient will report average daily pain as 2/10 or less in order to progress toward personal goals. Status at last note/certification: 6/86: 5/10  Current:   goal not met, 4/10 avg pain  3. Patient will increase b/l shoulder ER to >/= 4-/5 for increased tolerance with household chores/activities. Status at last note/certification: 8/75: R 3+/5, L 3+/5  Current:     4.  Patient will improve deep c/s neck flexor endurance to at least 10\" for decreased HA's  Status at last note/certification: 1/17:  5\" p!  Current:  1/31: Goal not met; cues for form with c/s retraction, not yet able to tolerate tuck/lift with exercises  2/6/23: progressed to deep cervical flexion but with 1:1 supervision and heavy cueing    PLAN  Yes  Continue plan of care  []  Upgrade activities as tolerated  []  Discharge due to :  []  Other:    Amy Hernandes, PT    2/13/2023    1:08 PM    Future Appointments   Date Time Provider Department Center   2/13/2023  6:00 PM Amy Hernandes, PT [facility]   2/15/2023  6:00 PM Jhon Avila, PTA [facility]   2/21/2023  6:00 PM Tori Goff, PT [facility]   2/23/2023  6:00 PM Gladys Saldana, PT [facility]   2/27/2023  6:00 PM Amy Hernandes, PT [facility]

## 2023-02-15 ENCOUNTER — APPOINTMENT (OUTPATIENT)
Facility: HOSPITAL | Age: 48
End: 2023-02-15
Payer: COMMERCIAL

## 2023-02-15 ENCOUNTER — TELEPHONE (OUTPATIENT)
Facility: HOSPITAL | Age: 48
End: 2023-02-15

## 2023-02-17 ENCOUNTER — HOSPITAL ENCOUNTER (OUTPATIENT)
Facility: HOSPITAL | Age: 48
Setting detail: RECURRING SERIES
Discharge: HOME | End: 2023-02-20
Payer: COMMERCIAL

## 2023-02-17 PROCEDURE — 97140 MANUAL THERAPY 1/> REGIONS: CPT

## 2023-02-17 PROCEDURE — 97110 THERAPEUTIC EXERCISES: CPT

## 2023-02-17 PROCEDURE — 97530 THERAPEUTIC ACTIVITIES: CPT

## 2023-02-17 NOTE — PROGRESS NOTES
PHYSICAL / OCCUPATIONAL THERAPY - DAILY TREATMENT NOTE (updated )    Patient Name: Alf Worrell    Date: 2023    : 1975  Insurance: Payor: Monica Agosto / Plan: OPTIMA PPO / Product Type: *No Product type* /      Patient  verified Yes     Visit #   Current / Total 12 14   Time   In / Out 7:22 8:17   Pain   In / Out 3/10 3/10   Subjective Functional Status/Changes:  \"I can't sit long at all\" \"I don't get headaches as much anymore like I did in the beginning\"    Changes to:  Meds, Allergies, Med Hx, Sx Hx? If yes, update Summary List no       TREATMENT AREA =  Cervicalgia [M54.2]    OBJECTIVE       Therapeutic Procedures: Tx Min Billable or 1:1 Min (if diff from Tx Min) Procedure, Rationale, Specifics   25  38082 Therapeutic Exercise (timed):  increase ROM, strength, coordination, balance, and proprioception to improve patient's ability to progress to PLOF and address remaining functional goals. (see flow sheet as applicable)     Details if applicable:  FOTO, [facility]     20  78946 Therapeutic Activity (timed):  use of dynamic activities replicating functional movements to increase ROM, strength, coordination, balance, and proprioception in order to improve patient's ability to progress to PLOF and address remaining functional goals. (see flow sheet as applicable)     Details if applicable:  FOTO, [facility]   10  28465 Manual Therapy (timed):  decrease pain, increase ROM, increase tissue extensibility, decrease trigger points, and increase postural awareness to improve patient's ability to progress to PLOF and address remaining functional goals. The manual therapy interventions were performed at a separate and distinct time from the therapeutic activities interventions .  (see flow sheet as applicable)     Details if applicable:  STM to B UT, c/s paraspinals, SOR; rhomboids, mid trap; t/s PA mob gr II          Details if applicable:            Details if applicable:     54  [facility] Totals Reminder: Lorena Carlin using total billable min of TIMED therapeutic procedures (example: do not include dry needle or estim unattended, both untimed codes, in totals to left)  8-22 min = 1 unit; 23-37 min = 2 units; 38-52 min = 3 units; 53-67 min = 4 units; 68-82 min = 5 units   Total Total     [x]  Patient Education billed concurrently with other procedures   [x] Review HEP    [] Progressed/Changed HEP, detail:    [] Other detail:       Objective Information/Functional Measures/Assessment  Goals assessed for PN  Pain at best 3/10, at worst 9/10, average 5/10  Increases pain: prolonged sitting and increased use of B UE \"I can't sit long at all\"   Subjective % improvement 70%  Objective:   B ER MMT: 4-/5 B  B IR MMT: 4-/5 B  Flexion: L 4/5 R 4/5 P! Deep cervical neck flexor endurance: 7\"  Improvements: \"I don't get headaches as much anymore like I did in the beginning\" No HAs in the last week, less tightness in B shoulders and C/spine  Deficits prolonged flexion of B shoulders, OH reaching    FOTO: 49/100    Patient will continue to benefit from skilled PT / OT services to modify and progress therapeutic interventions, analyze and address functional mobility deficits, analyze and address ROM deficits, analyze and address strength deficits, analyze and address soft tissue restrictions, analyze and cue for proper movement patterns, analyze and modify for postural abnormalities, and instruct in home and community integration to address functional deficits and attain remaining goals. Progress toward goals / Updated goals:  []  See Progress Note/Recertification    1. Patient will improve FOTO assessment score to 67 pts in order to indicate improved functional abilities. Status at last note/certification: 9/36: 39  Current:  progressing since last assessment 49/100   2. Patient will report average daily pain as 2/10 or less in order to progress toward personal goals.   Status at last note/certification: 8/94: 5/10  Current:  goal not met, 5/10 avg pain  3. Patient will increase b/l shoulder ER to >/= 4-/5 for increased tolerance with household chores/activities. Status at last note/certification: 3/46: R 3+/5, L 3+/5  Current:  goal met, B 4-/5  4. Patient will improve deep c/s neck flexor endurance to at least 10\" for decreased HA's  Status at last note/certification: 1/40:  5\" p!   Current:  1/31: Goal not met; cues for form with c/s retraction, not yet able to tolerate tuck/lift with exercises  2/6/23: progressed to deep cervical flexion but with 1:1 supervision and heavy cueing  Current: goal not met, 7\" hold with pain    PLAN  Yes  Continue plan of care  []  Upgrade activities as tolerated  []  Discharge due to :  []  Other:    Rosie Palomino, PTA    2/17/2023    6:42 AM    Future Appointments   Date Time Provider Isaac Calderon   2/17/2023  7:30 AM [address] PT [facility] [address]   2/21/2023  6:00 PM Tori Fink, PT [facility] [address]   2/23/2023  6:00 PM Sherly Mcclain, PT [facility] [address]   2/27/2023  6:00 PM Roula Strong, PT [facility] [address]

## 2023-02-17 NOTE — PROGRESS NOTES
[address] [place] [address] PHYSICAL THERAPY  [facility], [place], [address]  Phone: [phone]   [phone]  PHYSICAL THERAPY PROGRESS NOTE  Patient Name: Dianna Cortez : 1975   Treatment/Medical Diagnosis: Cervicalgia [M54.2]   Referral Source: Etienne Dominguez MD     Date of Initial Visit: 2023 Attended Visits: 12 Missed Visits: 3     SUMMARY OF TREATMENT  Pt has attended 12 session of out patient PT since the [facility]. Therapy services have included therapeutic exercises, therapeutic activities, manual therapy, and neuromuscular re-ed. CURRENT STATUS  Pt reports decreased in headache frequenting since [facility], along with less tightness in B shoulder and C/spine. Pt remains to have deficits with prolonged postures in B shoulder flexion and OH reaching. Pt demonstrates increased strength in B shoulder IR/ER and cervical flexion endurance since last assessment. Pt will benefit from continued skilled therapy to increase B UE shoulder strength cervical flexion endurance for functional tasks with less pain. Goals assessed for PN:  Pain at best 3/10, at worst 9/10, average 5/10  Increases pain: prolonged sitting and increased use of B UE \"I can't sit long at all\"   Subjective % improvement 70%  Objective:   B ER MMT: 4-/5 B  B IR MMT: 4-/5 B  Flexion: L 4/5 R 4/5 P! Deep cervical flexor endurance: 7\"  Improvements: \"I don't get headaches as much anymore like I did in the beginning\" No HAs in the last week, less tightness in B shoulders and C/spine  Deficits prolonged flexion of B shoulders, OH reaching     FOTO: 49/100    Progress toward goals / Updated goals:    1. Patient will improve FOTO assessment score to 67 pts in order to indicate improved functional abilities. Status at last note/certification: 0: 39  Current:  progressing since last assessment 49/100   2.  Patient will report average daily pain as 2/10 or less in order to progress toward personal goals. Status at last note/certification: 0/10: 5/10  Current:  goal not met,  5/10 avg pain  3. Patient will increase b/l shoulder ER to >/= 4-/5 for increased tolerance with household chores/activities. Status at last note/certification: 8/78: R 3+/5, L 3+/5  Current:  goal met, B 4-/5  4. Patient will improve deep c/s neck flexor endurance to at least 10\" for decreased HA's  Status at last note/certification: 2/51:  5\" p! Current:  1/31: Goal not met; cues for form with c/s retraction, not yet able to tolerate tuck/lift with exercises  2/6/23: progressed to deep cervical flexion but with 1:1 supervision and heavy cueing  Current: goal not met, 7\" hold with pain      New Goals to be achieved in __6-8__ treatments  1. Patient will improve FOTO assessment score to 67 pts in order to indicate improved functional abilities. Status at last note/certification: 66/840  2. Patient will increase b/l shoulder ER to >/= 4+/5 for increased tolerance with household chores/activities. Status at last note/certification: B ER 4-/5  3. Patient will improve deep c/s neck flexor endurance to at least 10\" for decreased HA's  Status at last note/certification: 7\" hold with pain  4. Patient will report average daily pain as 2/10 or less in order to progress toward personal goals. Status at last note/certification: 4/20 average daily    RECOMMENDATIONS  Continue with current POC for 6-8 visits in preporation for possible D/C. If you have any questions/comments please contact us directly at [phone]. Thank you for allowing us to assist in the care of your patient.     Harman Stone PTA /Amy Boggs PT, DPT, CMTPT      2/17/2023       8:42 AM

## 2023-02-20 ENCOUNTER — HOSPITAL ENCOUNTER (OUTPATIENT)
Facility: HOSPITAL | Age: 48
Setting detail: RECURRING SERIES
Discharge: HOME | End: 2023-02-23
Payer: COMMERCIAL

## 2023-02-20 PROCEDURE — 97140 MANUAL THERAPY 1/> REGIONS: CPT

## 2023-02-20 PROCEDURE — 97112 NEUROMUSCULAR REEDUCATION: CPT

## 2023-02-20 PROCEDURE — 97530 THERAPEUTIC ACTIVITIES: CPT

## 2023-02-20 PROCEDURE — 97110 THERAPEUTIC EXERCISES: CPT

## 2023-02-20 NOTE — PROGRESS NOTES
PHYSICAL / OCCUPATIONAL THERAPY - DAILY TREATMENT NOTE (updated )    Patient Name: Imelda Perry    Date: 2023    : 1975  Insurance: Payor: Addi Galicia / Plan: OPTIMA PPO / Product Type: *No Product type* /      Patient  verified Yes     Visit #   Current / Total 13 18-20   Time   In / Out 7:31 8:18   Pain   In / Out 2/10 3/10   Subjective Functional Status/Changes:  States most limited with prolonged sitting; pain starts at low back and goes up her spine to her neck. Changes to:  Meds, Allergies, Med Hx, Sx Hx? If yes, update Summary List no       TREATMENT AREA =  Cervicalgia [M54.2]    OBJECTIVE       Therapeutic Procedures: Tx Min Billable or 1:1 Min (if diff from Tx Min) Procedure, Rationale, Specifics   17  67296 Therapeutic Exercise (timed):  increase ROM, strength, coordination, balance, and proprioception to improve patient's ability to progress to PLOF and address remaining functional goals. (see flow sheet as applicable)     Details if applicable:  FOTO, [facility]     14  83359 Therapeutic Activity (timed):  use of dynamic activities replicating functional movements to increase ROM, strength, coordination, balance, and proprioception in order to improve patient's ability to progress to PLOF and address remaining functional goals. (see flow sheet as applicable)     Details if applicable:  FOTO, [facility]   8  [id number] Neuromuscular Re-Education (timed):  improve balance, coordination, kinesthetic sense, posture, core stability and proprioception to improve patient's ability to develop conscious control of individual muscles and awareness of position of extremities in order to progress to PLOF and address remaining functional goals.  (see flow sheet as applicable)     Details if applicable:     8   97154 Manual Therapy (timed):  decrease pain, increase ROM, increase tissue extensibility, decrease trigger points, and increase postural awareness to improve patient's ability to progress to PLOF and address remaining functional goals. The manual therapy interventions were performed at a separate and distinct time from the therapeutic activities interventions . (see flow sheet as applicable)     Details if applicable:  DTM to B MT, rhomboids; mid-t/s PA mob gr V           Details if applicable:     52  [facility] Totals Reminder: bill using total billable min of TIMED therapeutic procedures (example: do not include dry needle or estim unattended, both untimed codes, in totals to left)  8-22 min = 1 unit; 23-37 min = 2 units; 38-52 min = 3 units; 53-67 min = 4 units; 68-82 min = 5 units   Total Total     [x]  Patient Education billed concurrently with other procedures   [x] Review HEP    [x] Progressed/Changed HEP, detail:  (see chart copy)  [x] Other detail:   pt ed on postural checks with sitting; advised to change position/stretch intermittently at least Q30 min with sitting     Objective Information/Functional Measures/Assessment  -increased resistance band rows/shoulder ext  -progressed band pull downs to seated SB for increased core activation in sitting  -pt c/o mid back pain with band shoulder ER after ~7 reps (did not inform PT until after completion.  -vc's for TA engagement with TA march (compensating with GS)  -added bridge (limited to ~25%; no pain)    Pt program adjusted today for increased core activation to promote improved sitting tolerance with ADL's. Pt demonstrates c/s ROT WNL b/l with only report of \"tight\" to right; d/c'd c/s SNAGs. Plan progression of C/T stabilty and scapular mechanics with core stabilization to attain LTG's. Pt reporting slight increase in pain/mm soreness after session.       Patient will continue to benefit from skilled PT / OT services to modify and progress therapeutic interventions, analyze and address functional mobility deficits, analyze and address ROM deficits, analyze and address strength deficits, analyze and address soft tissue restrictions, analyze and cue for proper movement patterns, analyze and modify for postural abnormalities, and instruct in home and community integration to address functional deficits and attain remaining goals. Progress toward goals / Updated goals:  []  See Progress Note/Recertification       New Goals to be achieved in __6-8__ treatments  1. Patient will improve FOTO assessment score to 67 pts in order to indicate improved functional abilities. Status at last note/certification: 45/549  2. Patient will increase b/l shoulder ER to >/= 4+/5 for increased tolerance with household chores/activities. Status at last note/certification: B ER 4-/5  3. Patient will improve deep c/s neck flexor endurance to at least 10\" for decreased HA's  Status at last note/certification: 7\" hold with pain  4. Patient will report average daily pain as 2/10 or less in order to progress toward personal goals.   Status at last note/certification: 3/29 average daily       PLAN  Yes  Continue plan of care  []  Upgrade activities as tolerated  []  Discharge due to :  []  Other:    Avel Camacho, PT    2/20/2023    7:35 AM    Future Appointments   Date Time Provider Isaac Calderon   2/23/2023  6:00 PM Blane Ibrahim, PT [facility]   3/1/2023  7:00 AM Erroll Sandifer, PT [facility]

## 2023-02-21 ENCOUNTER — APPOINTMENT (OUTPATIENT)
Facility: HOSPITAL | Age: 48
End: 2023-02-21
Payer: COMMERCIAL

## 2023-02-23 ENCOUNTER — APPOINTMENT (OUTPATIENT)
Facility: HOSPITAL | Age: 48
End: 2023-02-23
Payer: COMMERCIAL

## 2023-02-24 ENCOUNTER — HOSPITAL ENCOUNTER (OUTPATIENT)
Facility: HOSPITAL | Age: 48
Setting detail: RECURRING SERIES
Discharge: HOME | End: 2023-02-27
Payer: COMMERCIAL

## 2023-02-24 PROCEDURE — 97530 THERAPEUTIC ACTIVITIES: CPT

## 2023-02-24 PROCEDURE — 97140 MANUAL THERAPY 1/> REGIONS: CPT

## 2023-02-24 PROCEDURE — 97110 THERAPEUTIC EXERCISES: CPT

## 2023-02-24 NOTE — PROGRESS NOTES
PHYSICAL / OCCUPATIONAL THERAPY - DAILY TREATMENT NOTE (updated )    Patient Name: Ying Velázquez    Date: 2023    : 1975  Insurance: Payor: Meir Garcia / Plan: OPTIMA PPO / Product Type: *No Product type* /      Patient  verified Yes     Visit #   Current / Total 14 18-20   Time   In / Out 7:03 7:45   Pain   In / Out 3/10 4/10   Subjective Functional Status/Changes: I have been getting more of a radiating pain almost like a loss of circulation going down my left since yesterday and it started when I was using the computer. Changes to:  Meds, Allergies, Med Hx, Sx Hx? If yes, update Summary List no       TREATMENT AREA =  Cervicalgia [M54.2]    OBJECTIVE         Therapeutic Procedures: Tx Min Billable or 1:1 Min (if diff from Tx Min) Procedure, Rationale, Specifics   20  00587 Therapeutic Exercise (timed):  increase ROM, strength, coordination, balance, and proprioception to improve patient's ability to progress to PLOF and address remaining functional goals. (see flow sheet as applicable)     Details if applicable:       10  38186 Therapeutic Activity (timed):  use of dynamic activities replicating functional movements to increase ROM, strength, coordination, balance, and proprioception in order to improve patient's ability to progress to PLOF and address remaining functional goals. (see flow sheet as applicable)     Details if applicable:     12  78768 Manual Therapy (timed):  decrease pain, increase ROM, increase tissue extensibility, decrease trigger points, and increase postural awareness to improve patient's ability to progress to PLOF and address remaining functional goals. The manual therapy interventions were performed at a separate and distinct time from the therapeutic activities interventions .  (see flow sheet as applicable)     Details if applicable:  SOR and STM/tissue mobs to L>R cervical musculature in prone           Details if applicable:            Details if applicable:     43  [facility] Totals Reminder: bill using total billable min of TIMED therapeutic procedures (example: do not include dry needle or estim unattended, both untimed codes, in totals to left)  8-22 min = 1 unit; 23-37 min = 2 units; 38-52 min = 3 units; 53-67 min = 4 units; 68-82 min = 5 units   Total Total     [x]  Patient Education billed concurrently with other procedures   [] Review HEP    [] Progressed/Changed HEP, detail:    [] Other detail:       Objective Information/Functional Measures/Assessment  Verbal cueing/demonstration for proper form/technique with various exercises/activities during treatment. Pt presented with new chief c/o increased L UE radicular and scapular pain/numbness with no particular change/increase in activity except for prolonged computer use since last treatment. Pt did present with a large palpable trigger point in L upper trap and superior scapular region with manual intervention which reproduced L UE radicular symptoms with deep palpation/STM to region. Will continue to progress/advance within current POC with monitoring symptoms as tolerated. Patient will continue to benefit from skilled PT / OT services to modify and progress therapeutic interventions, analyze and address functional mobility deficits, analyze and address ROM deficits, analyze and address strength deficits, analyze and address soft tissue restrictions, analyze and cue for proper movement patterns, analyze and modify for postural abnormalities, and instruct in home and community integration to address functional deficits and attain remaining goals. Progress toward goals / Updated goals:  []  See Progress Note/Recertification  New Goals to be achieved in __6-8__ treatments  1. Patient will improve FOTO assessment score to 67 pts in order to indicate improved functional abilities. Status at last note/certification: 17/425  2.  Patient will increase b/l shoulder ER to >/= 4+/5 for increased tolerance with household chores/activities. Status at last note/certification: B ER 4-/5  3. Patient will improve deep c/s neck flexor endurance to at least 10\" for decreased HA's  Status at last note/certification: 7\" hold with pain  4. Patient will report average daily pain as 2/10 or less in order to progress toward personal goals.   Status at last note/certification: 1/65 average daily    PLAN  Yes  Continue plan of care  [x]  Upgrade activities as tolerated  []  Discharge due to :  []  Other:    Kristal Johsnon PTA    2/24/2023    7:06 AM    Future Appointments   Date Time Provider Isaac Calderon   3/1/2023  7:00 AM Severa Oyster, PT [facility]

## 2023-02-27 ENCOUNTER — APPOINTMENT (OUTPATIENT)
Facility: HOSPITAL | Age: 48
End: 2023-02-27
Payer: COMMERCIAL

## 2023-03-01 ENCOUNTER — HOSPITAL ENCOUNTER (OUTPATIENT)
Facility: HOSPITAL | Age: 48
Setting detail: RECURRING SERIES
Discharge: HOME | End: 2023-03-04
Payer: COMMERCIAL

## 2023-03-01 PROCEDURE — 97140 MANUAL THERAPY 1/> REGIONS: CPT

## 2023-03-01 PROCEDURE — 97112 NEUROMUSCULAR REEDUCATION: CPT

## 2023-03-01 PROCEDURE — 97110 THERAPEUTIC EXERCISES: CPT

## 2023-03-01 PROCEDURE — 97530 THERAPEUTIC ACTIVITIES: CPT

## 2023-03-01 NOTE — PROGRESS NOTES
PHYSICAL / OCCUPATIONAL THERAPY - DAILY TREATMENT NOTE (updated )    Patient Name: Marlena Gonzalez    Date: 3/1/2023    : 1975  Insurance: Payor: Stephanie Ferreira / Plan: OPTIM[employer] PPO / Product Type: *No Product type* /      Patient  verified Yes     Visit #   Current / Total 15 18-20   Time   In / Out 7:02 7:58   Pain   In / Out 2 2   Subjective Functional Status/Changes: States max pain this week up to 10/10 with sitting in her office chair   Changes to:  Meds, Allergies, Med Hx, Sx Hx? If yes, update Summary List no       TREATMENT AREA =  Cervicalgia [M54.2]    OBJECTIVE    Modalities Rationale:     decrease pain and increase tissue extensibility to improve patient's ability to progress to PLOF and address remaining functional goals. min [] Estim Unattended, type/location:                                      []  w/ice    []  w/heat    min [] Estim Attended, type/location:                                     []  w/US     []  w/ice    []  w/heat    []  TENS insruct      min []  Mechanical Traction: type/lbs                   []  pro   []  sup   []  int   []  cont    []  before manual    []  after manual    min []  Ultrasound, settings/location:      min []  Iontophoresis w/ dexamethasone, location:                                               []  take home patch       []  in clinic   10 min  unbill []  Ice     [x]  Heat    location/position: C/s in short sitting    min []  Paraffin,  details:     min []  Vasopneumatic Device, press/temp:     min []  [employer] / [employer]:        min []  Other:    Skin assessment post-treatment (if applicable):    [x]  intact    []  redness- no adverse reaction                 []redness - adverse reaction:          Therapeutic Procedures:   Tx Min Billable or 1:1 Min (if diff from Tx Min) Procedure, Rationale, Specifics   13  38616 Therapeutic Exercise (timed):  increase ROM, strength, coordination, balance, and proprioception to improve patient's ability to progress to PLOF and address remaining functional goals. (see flow sheet as applicable)     Details if applicable:       10  39495 Therapeutic Activity (timed):  use of dynamic activities replicating functional movements to increase ROM, strength, coordination, balance, and proprioception in order to improve patient's ability to progress to PLOF and address remaining functional goals. (see flow sheet as applicable)     Details if applicable:     10  68522 Manual Therapy (timed):  decrease pain, increase ROM, increase tissue extensibility, decrease trigger points, and increase postural awareness to improve patient's ability to progress to PLOF and address remaining functional goals. The manual therapy interventions were performed at a separate and distinct time from the therapeutic activities interventions . (see flow sheet as applicable)     Details if applicable:  STM to L>R SCM, scalenes, sub-occipitals, c/s paraspinals   13   75219 Neuromuscular Re-Education (timed):  improve balance, coordination, kinesthetic sense, posture, core stability and proprioception to improve patient's ability to develop conscious control of individual muscles and awareness of position of extremities in order to progress to PLOF and address remaining functional goals.  (see flow sheet as applicable)       Details if applicable:            Details if applicable:     55  [facility] Totals Reminder: bill using total billable min of TIMED therapeutic procedures (example: do not include dry needle or estim unattended, both untimed codes, in totals to left)  8-22 min = 1 unit; 23-37 min = 2 units; 38-52 min = 3 units; 53-67 min = 4 units; 68-82 min = 5 units   Total Total     [x]  Patient Education billed concurrently with other procedures   [] Review HEP    [x] Progressed/Changed HEP, detail:  issued HEP for SCM stretching (see chart copy)  [x] Other detail:   discussed need for supportive work chair and ergonomic work station set up; advised she could have a friend take a picture of her in her chair for ergonomic assessment     Objective Information/Functional Measures/Assessment  -advanced doorway pec stretch to 1/2 FR angels  -vc's/mc's for form with QP SA punch  -added UE bird dog; fists to table 2/2 c/o wrist pain with flat palm  -pt c/o pain down LUE with C/T stab PRE; d/c after 10 reps    Pt demonstrates significant pec tightness with 1/2 FR angels with hands leaving mat table ~80 deg abduction. Significant hypertonicity to L SCM, moderate to additional mm's addressed with manual therapy with good reduction in tone post treatment. Plan progressive C/T strengthening for LTG attainment. Patient will continue to benefit from skilled PT / OT services to modify and progress therapeutic interventions, analyze and address functional mobility deficits, analyze and address ROM deficits, analyze and address strength deficits, analyze and address soft tissue restrictions, analyze and cue for proper movement patterns, analyze and modify for postural abnormalities, and instruct in home and community integration to address functional deficits and attain remaining goals. Progress toward goals / Updated goals:  []  See Progress Note/Recertification  New Goals to be achieved in __6-8__ treatments  1. Patient will improve FOTO assessment score to 67 pts in order to indicate improved functional abilities. Status at last note/certification: 63/131  2. Patient will increase b/l shoulder ER to >/= 4+/5 for increased tolerance with household chores/activities. Status at last note/certification: B ER 4-/5  3. Patient will improve deep c/s neck flexor endurance to at least 10\" for decreased HA's  Status at last note/certification: 7\" hold with pain  4. Patient will report average daily pain as 2/10 or less in order to progress toward personal goals.   Status at last note/certification: 9/74 average daily  Current: 2/28: Goal Not met; reports average daily pain 5/10    PLAN  Yes  Continue plan of care  [x]  Upgrade activities as tolerated  []  Discharge due to :  []  Other:    Danny Banda, PT    3/1/2023    7:00 AM    No future appointments.

## 2023-03-07 ENCOUNTER — HOSPITAL ENCOUNTER (OUTPATIENT)
Facility: HOSPITAL | Age: 48
Setting detail: RECURRING SERIES
Discharge: HOME | End: 2023-03-10
Payer: COMMERCIAL

## 2023-03-07 PROCEDURE — 97140 MANUAL THERAPY 1/> REGIONS: CPT

## 2023-03-07 PROCEDURE — 97530 THERAPEUTIC ACTIVITIES: CPT

## 2023-03-07 PROCEDURE — 97110 THERAPEUTIC EXERCISES: CPT

## 2023-03-07 NOTE — PROGRESS NOTES
PHYSICAL / OCCUPATIONAL THERAPY - DAILY TREATMENT NOTE (updated )    Patient Name: Tonya Khan    Date: 3/7/2023    : 1975  Insurance: Payor: Cali Young / Plan: OPTIM[employer] PPO / Product Type: *No Product type* /      Patient  verified Yes     Visit #   Current / Total 16 18-20   Time   In / Out 7:07 7:45   Pain   In / Out 5/10 5/10   Subjective Functional Status/Changes: I have been feeling more pain on the left side of my neck for some reason since I was here last, the only thing that I can think of that I did differently was using the computer a lot. Changes to:  Meds, Allergies, Med Hx, Sx Hx? If yes, update Summary List yes       TREATMENT AREA =  Cervicalgia [M54.2]    OBJECTIVE    Therapeutic Procedures: Tx Min Billable or 1:1 Min (if diff from Tx Min) Procedure, Rationale, Specifics   18  94400 Therapeutic Exercise (timed):  increase ROM, strength, coordination, balance, and proprioception to improve patient's ability to progress to PLOF and address remaining functional goals. (see flow sheet as applicable)     Details if applicable:       10  62608 Therapeutic Activity (timed):  use of dynamic activities replicating functional movements to increase ROM, strength, coordination, balance, and proprioception in order to improve patient's ability to progress to PLOF and address remaining functional goals. (see flow sheet as applicable)     Details if applicable:     10  30694 Manual Therapy (timed):  decrease pain, increase ROM, increase tissue extensibility, decrease trigger points, and increase postural awareness to improve patient's ability to progress to PLOF and address remaining functional goals. The manual therapy interventions were performed at a separate and distinct time from the therapeutic activities interventions .  (see flow sheet as applicable)     Details if applicable:  SOR and STM/tissue mobs to L cervical musculature in prone           Details if applicable: Details if applicable:     45  [facility] Totals Reminder: bill using total billable min of TIMED therapeutic procedures (example: do not include dry needle or estim unattended, both untimed codes, in totals to left)  8-22 min = 1 unit; 23-37 min = 2 units; 38-52 min = 3 units; 53-67 min = 4 units; 68-82 min = 5 units   Total Total     [x]  Patient Education billed concurrently with other procedures   [x] Review HEP    [] Progressed/Changed HEP, detail:    [] Other detail:       Objective Information/Functional Measures/Assessment  Verbal cueing/demonstration for proper form/technique with various exercises/activities during treatment. Reviewed ergonomics and taking breaks from prolonged computer use  Advanced from supine marches to level 1 dead bug stabs and increased resistance with supine theraband ER  Pt presented with chief c/o increased L proximal cervical pain/tension from prolonged computer use since last treatment. Pt did present with moderate tension/congestion in region with manual intervention. Pt was able to advance from supine marches to level 1 dead bug stabs and increased resistance with supine theraband ER with min to mod challenge today. Will continue to progress/advance within current POC with monitoring symptoms as tolerated. Patient will continue to benefit from skilled PT / OT services to modify and progress therapeutic interventions, analyze and address functional mobility deficits, analyze and address ROM deficits, analyze and address strength deficits, analyze and address soft tissue restrictions, analyze and cue for proper movement patterns, analyze and modify for postural abnormalities, and instruct in home and community integration to address functional deficits and attain remaining goals. Progress toward goals / Updated goals:  []  See Progress Note/Recertification  New Goals to be achieved in __6-8__ treatments  1.  Patient will improve FOTO assessment score to 67 pts in order to indicate improved functional abilities. Status at last note/certification: 76/211  2. Patient will increase b/l shoulder ER to >/= 4+/5 for increased tolerance with household chores/activities. Status at last note/certification: B ER 4-/5  3. Patient will improve deep c/s neck flexor endurance to at least 10\" for decreased HA's  Status at last note/certification: 7\" hold with pain  4. Patient will report average daily pain as 2/10 or less in order to progress toward personal goals.   Status at last note/certification: 8/44 average daily  Current: 2/28: Goal Not met; reports average daily pain 5/10    PLAN  Yes  Continue plan of care  [x]  Upgrade activities as tolerated  []  Discharge due to :  []  Other:    Ana Rosa Rosario PTA    3/7/2023    7:01 AM    Future Appointments   Date Time Provider Isaac Calderon   3/10/2023  8:00 AM Ana Rosa Rosario PTA [id number]   3/16/2023  7:00 AM Ana Rosa Rosario PTA [facility] [id number]   3/17/2023  7:30 AM Ana Rosa Rosario PTA [id number] [id number]

## 2023-03-10 ENCOUNTER — APPOINTMENT (OUTPATIENT)
Facility: HOSPITAL | Age: 48
End: 2023-03-10
Payer: COMMERCIAL

## 2023-03-13 ENCOUNTER — HOSPITAL ENCOUNTER (OUTPATIENT)
Facility: HOSPITAL | Age: 48
Setting detail: RECURRING SERIES
Discharge: HOME | End: 2023-03-16
Payer: COMMERCIAL

## 2023-03-13 PROCEDURE — 97140 MANUAL THERAPY 1/> REGIONS: CPT

## 2023-03-13 PROCEDURE — 97110 THERAPEUTIC EXERCISES: CPT

## 2023-03-13 PROCEDURE — 97112 NEUROMUSCULAR REEDUCATION: CPT

## 2023-03-13 PROCEDURE — 97530 THERAPEUTIC ACTIVITIES: CPT

## 2023-03-13 NOTE — PROGRESS NOTES
PHYSICAL / OCCUPATIONAL THERAPY - DAILY TREATMENT NOTE (updated )    Patient Name: Bubba Duane    Date: 3/13/2023    : 1975  Insurance: Payor: Dank Turner / Plan: OPTIMA PPO / Product Type: *No Product type* /      Patient  verified Yes     Visit #   Current / Total 17 18-20   Time   In / Out 7:08 7:53   Pain   In / Out 3 1/10   Subjective Functional Status/Changes: Pt c/o increased L neck pain last night up to 8/10; did HEP stretching and took Ibuprofen to manage. Changes to:  Meds, Allergies, Med Hx, Sx Hx? If yes, update Summary List yes       TREATMENT AREA =  Cervicalgia [M54.2]    OBJECTIVE    Therapeutic Procedures: Tx Min Billable or 1:1 Min (if diff from Tx Min) Procedure, Rationale, Specifics   19  81008 Therapeutic Exercise (timed):  increase ROM, strength, coordination, balance, and proprioception to improve patient's ability to progress to PLOF and address remaining functional goals. (see flow sheet as applicable)     Details if applicable:       10  54576 Therapeutic Activity (timed):  use of dynamic activities replicating functional movements to increase ROM, strength, coordination, balance, and proprioception in order to improve patient's ability to progress to PLOF and address remaining functional goals. (see flow sheet as applicable)     Details if applicable:     8  38495 Manual Therapy (timed):  decrease pain, increase ROM, increase tissue extensibility, decrease trigger points, and increase postural awareness to improve patient's ability to progress to PLOF and address remaining functional goals. The manual therapy interventions were performed at a separate and distinct time from the therapeutic activities interventions .  (see flow sheet as applicable)     Details if applicable:  SOR and STM to L UT, scalenes, c/s paraspinals    8  54562 Neuromuscular Re-Education (timed):  improve balance, coordination, kinesthetic sense, posture, core stability and proprioception to improve patient's ability to develop conscious control of individual muscles and awareness of position of extremities in order to progress to PLOF and address remaining functional goals. (see flow sheet as applicable)        Details if applicable:            Details if applicable:     39  [facility] Totals Reminder: bill using total billable min of TIMED therapeutic procedures (example: do not include dry needle or estim unattended, both untimed codes, in totals to left)  8-22 min = 1 unit; 23-37 min = 2 units; 38-52 min = 3 units; 53-67 min = 4 units; 68-82 min = 5 units   Total Total     [x]  Patient Education billed concurrently with other procedures   [x] Review HEP    [] Progressed/Changed HEP, detail:    [] Other detail:       Objective Information/Functional Measures/Assessment  -requires supervision for correct hold/reps with open book/Dead bug  -added \"Y\" and D2 band flex to promote increased LT strength    Pt with moderate hypertonicity to L UT/scalenes/c/s paraspinals initially which did improve after manual therapy. Pt c/o wrist discomfort with QP exercises despite cues/ed for modification to fists. Pt may benefit from modification of SA punch in QP to band resisted/dumbbell SA punch NV. Plan for d/c to HEP NV. Patient will continue to benefit from skilled PT / OT services to modify and progress therapeutic interventions, analyze and address functional mobility deficits, analyze and address ROM deficits, analyze and address strength deficits, analyze and address soft tissue restrictions, analyze and cue for proper movement patterns, analyze and modify for postural abnormalities, and instruct in home and community integration to address functional deficits and attain remaining goals. Progress toward goals / Updated goals:  []  See Progress Note/Recertification  New Goals to be achieved in __6-8__ treatments  1.  Patient will improve FOTO assessment score to 67 pts in order to indicate improved functional abilities. Status at last note/certification: 87/819  2. Patient will increase b/l shoulder ER to >/= 4+/5 for increased tolerance with household chores/activities. Status at last note/certification: B ER 4-/5  3. Patient will improve deep c/s neck flexor endurance to at least 10\" for decreased HA's  Status at last note/certification: 7\" hold with pain  Current: 3/13: Goal Met: 12\" no pain  4. Patient will report average daily pain as 2/10 or less in order to progress toward personal goals.   Status at last note/certification: 8/52 average daily  Current: 2/28: Goal Not met; reports average daily pain 5/10    PLAN  Yes  Continue plan of care  [x]  Upgrade activities as tolerated  []  Discharge due to :  []  Other:    Sheng Jarrett PT    3/13/2023    7:10 AM    Future Appointments   Date Time Provider Isaac Calderon   3/16/2023  7:00 AM Wilda Bernabe PTA [id number] Statement Selected

## 2023-03-16 ENCOUNTER — HOSPITAL ENCOUNTER (OUTPATIENT)
Facility: HOSPITAL | Age: 48
Setting detail: RECURRING SERIES
Discharge: HOME | End: 2023-03-19
Payer: COMMERCIAL

## 2023-03-16 PROCEDURE — 97530 THERAPEUTIC ACTIVITIES: CPT

## 2023-03-16 PROCEDURE — 97110 THERAPEUTIC EXERCISES: CPT

## 2023-03-16 NOTE — PROGRESS NOTES
Physical Therapy Discharge Instructions  [address]  [address], [place] [address]  P: [phone] F: [phone]    Patient: Naveed Simeon  : 1975    Reason for Discharge From PT:  [x]Met/progressing towards all set goals    []Minimal progress made towards set goals    []Met a plateau in progress/improvement    []Insurance/financial issues    []Other:     Recommendations:   [x] Return to activity with home program as prescribed on print-outs    [] Return to activity with the following modifications:     [] [employer] fitness training     [] Return to/join local gym    [] Other:      Continue with      [] Ice [x] Heat   as needed for 10-15 minutes to relieve pain  *If pain does not improve after several days, follow-up with your physician for a consult*           Follow up with MD:     [] Upon completion of therapy   [x] As needed      Additional Comments: Great Job!, It was nice working with you! Thank you for choosing [facility] [place] for your PT needs!       Lupe Lorenzo, ENRIQUE 3/16/2023 7:27 AM
[] Other detail:       Objective Information/Functional Measures/Assessment  Verbal cueing/demonstration for proper form/technique with various exercises/activities during treatment. Progress toward goals / Updated goals:  [x]  See Progress Note/Recertification  See discharge summary for full detailed progress towards all established goals. PLAN  No  Continue plan of care  []  Upgrade activities as tolerated  [x]  Discharge due to :Pt has achieved maximum medical benefit/potential from therapy after attending 18 of 18-20 prescribed visits and is ready for discharge from therapy at this time. []  Other:    Brissa Lane, ENRIQUE    3/16/2023    7:06 AM    No future appointments.
pain as 2/10 or less in order to progress toward personal goals. Status at last Eval:  5/10 average daily  Current Status: 4/10 average daily  Goal Met?  progress      RECOMMENDATIONS  Pt has achieved maximum medical benefit/potential from therapy after attending 18 of 18-20 prescribed visits and is ready for discharge from therapy at this time. If you have any questions/comments please contact us directly at [phone]. Thank you for allowing us to assist in the care of your patient.     ENRIQUE Rosales PT, DPT, CMTPT    3/16/2023       7:14 AM

## 2023-03-17 ENCOUNTER — APPOINTMENT (OUTPATIENT)
Facility: HOSPITAL | Age: 48
End: 2023-03-17
Payer: COMMERCIAL